# Patient Record
Sex: MALE | Race: WHITE | Employment: FULL TIME | URBAN - METROPOLITAN AREA
[De-identification: names, ages, dates, MRNs, and addresses within clinical notes are randomized per-mention and may not be internally consistent; named-entity substitution may affect disease eponyms.]

---

## 2017-10-27 ENCOUNTER — APPOINTMENT (EMERGENCY)
Dept: RADIOLOGY | Facility: HOSPITAL | Age: 52
End: 2017-10-27
Payer: COMMERCIAL

## 2017-10-27 ENCOUNTER — HOSPITAL ENCOUNTER (EMERGENCY)
Facility: HOSPITAL | Age: 52
Discharge: HOME | End: 2017-10-27
Attending: EMERGENCY MEDICINE | Admitting: EMERGENCY MEDICINE
Payer: COMMERCIAL

## 2017-10-27 VITALS
WEIGHT: 189.15 LBS | TEMPERATURE: 98.4 F | SYSTOLIC BLOOD PRESSURE: 156 MMHG | HEART RATE: 96 BPM | RESPIRATION RATE: 16 BRPM | OXYGEN SATURATION: 97 % | DIASTOLIC BLOOD PRESSURE: 92 MMHG

## 2017-10-27 DIAGNOSIS — S46.911A STRAIN OF RIGHT SHOULDER, INITIAL ENCOUNTER: Primary | ICD-10-CM

## 2017-10-27 PROCEDURE — 96372 THER/PROPH/DIAG INJ SC/IM: CPT

## 2017-10-27 PROCEDURE — 73030 X-RAY EXAM OF SHOULDER: CPT

## 2017-10-27 PROCEDURE — 99283 EMERGENCY DEPT VISIT LOW MDM: CPT

## 2017-10-27 RX ORDER — KETOROLAC TROMETHAMINE 30 MG/ML
30 INJECTION, SOLUTION INTRAMUSCULAR; INTRAVENOUS ONCE
Status: COMPLETED | OUTPATIENT
Start: 2017-10-27 | End: 2017-10-27

## 2017-10-27 RX ORDER — LIDOCAINE 50 MG/G
1 PATCH TOPICAL ONCE
Status: DISCONTINUED | OUTPATIENT
Start: 2017-10-27 | End: 2017-10-27 | Stop reason: HOSPADM

## 2017-10-27 RX ORDER — TRAMADOL HYDROCHLORIDE 50 MG/1
50 TABLET ORAL EVERY 6 HOURS PRN
Qty: 6 TABLET | Refills: 0 | Status: SHIPPED | OUTPATIENT
Start: 2017-10-27 | End: 2017-11-06

## 2017-10-27 RX ORDER — NAPROXEN 500 MG/1
500 TABLET ORAL 2 TIMES DAILY WITH MEALS
Qty: 30 TABLET | Refills: 0 | Status: SHIPPED | OUTPATIENT
Start: 2017-10-27 | End: 2019-05-08

## 2017-10-27 RX ORDER — TRAMADOL HYDROCHLORIDE 50 MG/1
50 TABLET ORAL EVERY 6 HOURS PRN
Qty: 6 TABLET | Refills: 0 | Status: SHIPPED | OUTPATIENT
Start: 2017-10-27 | End: 2017-10-27

## 2017-10-27 RX ADMIN — KETOROLAC TROMETHAMINE 30 MG: 30 INJECTION, SOLUTION INTRAMUSCULAR at 08:33

## 2017-10-27 RX ADMIN — LIDOCAINE 1 PATCH: 50 PATCH CUTANEOUS at 08:33

## 2017-10-27 NOTE — ED PROVIDER NOTES
History  Chief Complaint   Patient presents with    Shoulder Pain     Pt reports he was curling 70lbs, heard a "crunch and crack" in R shoulder, pt c/o R shoulder pain with decreased "strength in R arm"     30-year-old male presents to the emergency department with complaints of right-sided shoulder pain  States that he was doing some biceps curls yesterday with a E-Z bar with 70lbs when he felt a crunching in his right shoulder  States that he had put the weight down and had pain in the area since  States the pain seems to be worse with movement  Did try Advil last night as well as Aleve in the middle of the night  States that he applied ice initially and is now trying a small heat pack  No previous injury to this shoulder  States the pain is worse with movement but better when resting  Patient is right-handed  History provided by:  Patient   used: No    Shoulder Pain   Location:  Shoulder  Shoulder location:  R shoulder  Injury: yes    Time since incident:  12 hours  Mechanism of injury comment:  Lifting heavy object  Pain details:     Quality:  Aching    Radiates to:  Does not radiate    Severity:  Moderate    Onset quality:  Sudden    Duration:  12 days    Timing:  Constant    Progression:  Unchanged  Handedness:  Right-handed  Prior injury to area:  No  Relieved by:  Nothing  Worsened by: Movement  Ineffective treatments:  NSAIDs  Associated symptoms: decreased range of motion    Associated symptoms: no back pain, no fatigue, no fever, no muscle weakness, no neck pain, no numbness, no stiffness, no swelling and no tingling        None       History reviewed  No pertinent past medical history  History reviewed  No pertinent surgical history  History reviewed  No pertinent family history  I have reviewed and agree with the history as documented      Social History   Substance Use Topics    Smoking status: Never Smoker    Smokeless tobacco: Never Used    Alcohol use No        Review of Systems   Constitutional: Negative for chills, fatigue and fever  Respiratory: Negative for cough  Cardiovascular: Negative for chest pain  Musculoskeletal: Negative for arthralgias, back pain, neck pain and stiffness  Shoulder pain   Skin: Negative for rash and wound  All other systems reviewed and are negative  Physical Exam  ED Triage Vitals [10/27/17 0803]   Temperature Pulse Respirations Blood Pressure SpO2   98 4 °F (36 9 °C) 96 16 156/92 97 %      Temp Source Heart Rate Source Patient Position - Orthostatic VS BP Location FiO2 (%)   Oral Monitor Sitting Right arm --      Pain Score       8           Orthostatic Vital Signs  Vitals:    10/27/17 0803   BP: 156/92   Pulse: 96   Patient Position - Orthostatic VS: Sitting       Physical Exam   Constitutional: He is oriented to person, place, and time  Vital signs are normal  He appears well-developed and well-nourished  HENT:   Head: Normocephalic and atraumatic  Cardiovascular: Normal rate and regular rhythm  Pulmonary/Chest: Effort normal and breath sounds normal  No respiratory distress  He has no wheezes  He has no rhonchi  He has no rales  Musculoskeletal:        Right shoulder: He exhibits decreased range of motion, tenderness, pain and decreased strength  He exhibits no bony tenderness, no swelling, no effusion, no crepitus, no deformity, no laceration, no spasm and normal pulse  Neurological: He is alert and oriented to person, place, and time  Skin: Skin is warm and dry  Psychiatric: He has a normal mood and affect  His behavior is normal    Nursing note and vitals reviewed        ED Medications  Medications   lidocaine (LIDODERM) 5 % patch 1 patch (1 patch Transdermal Medication Applied 10/27/17 0820)   ketorolac (TORADOL) 30 mg/mL injection 30 mg (30 mg Intramuscular Given 10/27/17 9783)       Diagnostic Studies  Results Reviewed     None                 XR shoulder 2+ views RIGHT   ED Interpretation by Leticia Nieves PA-C (10/27 0849)   No fracture      Final Result by Nii Barrera MD (10/27 0900)      No acute osseous abnormality  Subtle calcific density adjacent to the humeral head is most consistent with calcific rotator cuff tendinopathy  Workstation performed: [id number]                    Procedures  Procedures       Phone Contacts  ED Phone Contact    ED Course  ED Course                                MDM  Number of Diagnoses or Management Options  Strain of right shoulder, initial encounter:   Diagnosis management comments:  Differential diagnosis includes but not limited to: Shoulder strain, avulsion fracture, rotator cuff injury  Amount and/or Complexity of Data Reviewed  Tests in the radiology section of CPT®: reviewed and ordered  Independent visualization of images, tracings, or specimens: yes      CritCare Time    Disposition  Final diagnoses:   Strain of right shoulder, initial encounter     Time reflects when diagnosis was documented in both MDM as applicable and the Disposition within this note     Time User Action Codes Description Comment    10/27/2017  8:50 AM Neha Gilbert Add [Z97 590A] Strain of right shoulder, initial encounter       ED Disposition     ED Disposition Condition Comment    Discharge  [address] discharge to home/self care      Condition at discharge: Stable        Follow-up Information     Follow up With Specialties Details Why [id number] [facility] [place]  Schedule an appointment as soon as possible for a visit  [address]  [phone]        Discharge Medication List as of 10/27/2017  8:54 AM      START taking these medications    Details   naproxen (NAPROSYN) 500 mg tablet Take 1 tablet by mouth 2 (two) times a day with meals, Starting Fri 10/27/2017, Print      traMADol (ULTRAM) 50 mg tablet Take 1 tablet by mouth every 6 (six) hours as needed for moderate pain for up to 10 days, Starting Fri 10/27/2017, Until Mon 11/6/2017, Print           No discharge procedures on file      ED Provider  Electronically Signed by           Derek Rolle PA-C  10/27/17 1036

## 2017-10-27 NOTE — ED NOTES
Reviewed d/c instructions with pt, who states he has no questions at this time       Yolande Baker RN  10/27/17 1648

## 2017-10-27 NOTE — DISCHARGE INSTRUCTIONS
Rotator Cuff Injury   WHAT YOU NEED TO KNOW:   A rotator cuff injury is damage to the muscles or tendons of your rotator cuff  The rotator cuff is made up of a group of muscles and tendons that hold the shoulder joint in place  The damage may include stretching of your muscle or tears in the tendons  It may also include inflammation of the bursa (small sack of fluid around the joint)  DISCHARGE INSTRUCTIONS:   · Acetaminophen: This medicine decreases pain  Acetaminophen is available without a doctor's order  Ask how much to take and how often to take it  Follow directions  Acetaminophen can cause liver damage if not taken correctly  · NSAIDs:  These medicines decrease swelling, pain, and fever  NSAIDs are available without a doctor's order  Ask your healthcare provider which medicine is right for you  Ask how much to take and when to take it  Take as directed  NSAIDs can cause stomach bleeding or kidney problems if not taken correctly  · Pain medicine: You may be given a prescription medicine to decrease pain  Do not wait until the pain is severe before you take this medicine  · Steroids: This medicine may be injected into the rotator cuff area to decrease inflammation and pain  · Take your medicine as directed  Contact your healthcare provider if you think your medicine is not helping or if you have side effects  Tell him of her if you are allergic to any medicine  Keep a list of the medicines, vitamins, and herbs you take  Include the amounts, and when and why you take them  Bring the list or the pill bottles to follow-up visits  Carry your medicine list with you in case of an emergency  Follow up with your healthcare provider or orthopedist as directed:  Write down your questions so you remember to ask them during your visits  Physical therapy:  A physical therapist can teach you exercises to help improve movement and strength, and to decrease pain   These exercises may help you go back to your usual activities or return to playing sports  Self-care:   · Rest:  Rest may help your shoulder heal  Overuse of your shoulder can make your injury worse  Avoid heavy lifting, using your arms over your head, or any other activity that makes the pain worse  · Put ice or heat on your shoulder:  Use ice on your shoulder every few hours for the first several days  This may help decrease pain and swelling  After the first several days, a heating pad may help relax the muscles in your shoulder  Contact your healthcare provider or orthopedist if:   · The pain in your shoulder or arm is not improving, or is worse than before you started treatment  · You have new pain in your neck  · You have a fever  · You have questions or concerns about your condition or care  Return to the emergency department if:  · You suddenly cannot move your arm  · You have severe stomach or back pain, are vomiting blood, or have black bowel movements  © 2017 [employer]  Information is for End User's use only and may not be sold, redistributed or otherwise used for commercial purposes  All illustrations and images included in CareNotes® are the copyrighted property of [employer]  or [employer]  The above information is an  only  It is not intended as medical advice for individual conditions or treatments  Talk to your doctor, nurse or pharmacist before following any medical regimen to see if it is safe and effective for you

## 2017-11-03 ENCOUNTER — TRANSCRIBE ORDERS (OUTPATIENT)
Dept: ADMINISTRATIVE | Facility: HOSPITAL | Age: 52
End: 2017-11-03

## 2017-11-03 ENCOUNTER — ALLSCRIPTS OFFICE VISIT (OUTPATIENT)
Dept: OTHER | Facility: OTHER | Age: 52
End: 2017-11-03

## 2017-11-03 DIAGNOSIS — S46.011A STRAIN OF TENDON OF RIGHT ROTATOR CUFF, INITIAL ENCOUNTER: Primary | ICD-10-CM

## 2017-11-03 DIAGNOSIS — S46.011A STRAIN OF TENDON OF RIGHT ROTATOR CUFF: ICD-10-CM

## 2017-11-04 ENCOUNTER — HOSPITAL ENCOUNTER (OUTPATIENT)
Dept: MRI IMAGING | Facility: HOSPITAL | Age: 52
Discharge: HOME | End: 2017-11-04
Attending: ORTHOPAEDIC SURGERY
Payer: COMMERCIAL

## 2017-11-04 DIAGNOSIS — S46.011A STRAIN OF TENDON OF RIGHT ROTATOR CUFF: ICD-10-CM

## 2017-11-04 PROCEDURE — 73221 MRI JOINT UPR EXTREM W/O DYE: CPT

## 2017-11-04 NOTE — PROGRESS NOTES
Assessment  1  Strain of tendon of right rotator cuff, initial encounter (840 4) (F63 011L)    Plan  Strain of tendon of right rotator cuff, initial encounter    · MethylPREDNISolone 4 MG Oral Tablet Therapy Pack; use as directed   · * MRI SHOULDER RIGHT WO CONTRAST; Status:Need Information - Financial  Authorization; Requested [id number];     Discussion/Summary    Right shoulder rotator cuff strain versus tear after injury 10/26/17  NSAIDs and sling as needed  steroid taper  is unlikely to tolerate physical therapy based on level of pain  MRI right shoulder based on concern for rotator cuff injury  after MRI  Patient is able to Self-Care  The treatment plan was reviewed with the patient/guardian  The patient/guardian understands and agrees with the treatment plan     Self Referrals: No ER referral      Chief Complaint  1  Shoulder Pain    History of Present Illness  61-year-old male presents with acute onset of right shoulder pain while lifting weights in the gym on 10/26/17  He states that he was performing arm curls we felt severe pain and popping in his right shoulder localizing primarily to the lateral aspect  He has associated weakness and instability with reaching out or overhead  He was initially evaluated in the ER where x-rays were unremarkable  Was placed in a sling for comfort and has been taking naproxen and tramadol with no relief  The pain remains severe  He has significantly limited movement in his right arm  No numbness or tingling  past medical history, past surgical history, medications, allergies, social history, and family history were reviewed and updated today  of Systems:stated in the history  The remaining systems were reviewed and are documented on the Patient Information and Medical History form in the chart  Social History   · Never a smoker    Current Meds   1  Naproxen 500 MG Oral Tablet Recorded   2  TraMADol HCl - 50 MG Oral Tablet Recorded    Allergies  1   No Known Drug Allergies    Vitals   Recorded: [id number] 02:35PM   Heart Rate 87   Systolic 756   Diastolic 81   Height 5 ft 4 5 in   Weight 187 lb    BMI Calculated 31 6   BSA Calculated 1 91     Physical Exam  Right shoulder:  Tenderness to palpation over the anterior, lateral, and posterior aspect diffusely  Nontender to palpation over the [facility] joint  Range of motion is limited by pain  No gross instability  Impingement signs are positive  Empty can test is positive  Speed's test is positive  Cross-body adduction test is positive  3/5 strength forward flexion limited by pain  Constitutional - General appearance: Normal     Eyes - Conjunctiva and lids: Normal    Cardiovascular - Pulses: Normal   Lungs - Respiratory effort: Normal   Skin - Skin and subcutaneous tissue: Normal    Neurologic - Sensation: Normal    Psychiatric - Mood and affect: Normal         Results/Data  I personally reviewed the films/images/results in the office today  My interpretation follows  X-ray Review Right shoulder 10/27/17: No acute bony abnormalities  Mild degenerative changes acromioclavicular joint and glenohumeral joint  Tiny calcification adjacent to the apex of the humeral head        Signatures   Electronically signed by : ERAN Mohan ; Nov  3 2017  2:58PM EST                       (Author)

## 2017-11-13 ENCOUNTER — GENERIC CONVERSION - ENCOUNTER (OUTPATIENT)
Dept: OTHER | Facility: OTHER | Age: 52
End: 2017-11-13

## 2017-11-27 ENCOUNTER — GENERIC CONVERSION - ENCOUNTER (OUTPATIENT)
Dept: OTHER | Facility: OTHER | Age: 52
End: 2017-11-27

## 2017-12-06 RX ORDER — MULTIVITAMIN
1 CAPSULE ORAL DAILY
COMMUNITY

## 2017-12-06 RX ORDER — CHLORAL HYDRATE 500 MG
CAPSULE ORAL
COMMUNITY

## 2017-12-06 RX ORDER — BIOTIN 1 MG
TABLET ORAL
COMMUNITY

## 2017-12-06 RX ORDER — NAPROXEN 500 MG/1
TABLET ORAL
COMMUNITY
End: 2017-12-18 | Stop reason: HOSPADM

## 2017-12-06 RX ORDER — TRAMADOL HYDROCHLORIDE 50 MG/1
TABLET ORAL
Status: ON HOLD | COMMUNITY
End: 2017-12-18 | Stop reason: HOSPADM

## 2017-12-06 NOTE — PRE-PROCEDURE INSTRUCTIONS
Pre-Surgery Instructions:   Medication Instructions    Cholecalciferol (VITAMIN D3) 1000 units CAPS Instructed patient per Anesthesia Guidelines   Glucosamine-Chondroitin (MOVE FREE PO) Instructed patient per Anesthesia Guidelines   Multiple Vitamin (MULTIVITAMIN) capsule Instructed patient per Anesthesia Guidelines   naproxen (NAPROSYN) 500 mg tablet Instructed patient per Anesthesia Guidelines   Omega-3 1000 MG CAPS Instructed patient per Anesthesia Guidelines   traMADol (ULTRAM) 50 mg tablet Instructed patient per Anesthesia Guidelines  Bathing instructions given

## 2017-12-08 ENCOUNTER — TRANSCRIBE ORDERS (OUTPATIENT)
Dept: LAB | Facility: CLINIC | Age: 52
End: 2017-12-08

## 2017-12-08 ENCOUNTER — APPOINTMENT (OUTPATIENT)
Dept: PREADMISSION TESTING | Facility: HOSPITAL | Age: 52
End: 2017-12-08
Payer: COMMERCIAL

## 2017-12-08 ENCOUNTER — APPOINTMENT (OUTPATIENT)
Dept: LAB | Facility: CLINIC | Age: 52
End: 2017-12-08
Payer: COMMERCIAL

## 2017-12-08 DIAGNOSIS — S46.011A STRAIN OF TENDON OF RIGHT ROTATOR CUFF, INITIAL ENCOUNTER: Primary | ICD-10-CM

## 2017-12-08 DIAGNOSIS — S46.011A STRAIN OF TENDON OF RIGHT ROTATOR CUFF, INITIAL ENCOUNTER: ICD-10-CM

## 2017-12-08 LAB
ANION GAP SERPL CALCULATED.3IONS-SCNC: 7 MMOL/L (ref 4–13)
BUN SERPL-MCNC: 12 MG/DL (ref 5–25)
CALCIUM SERPL-MCNC: 9.3 MG/DL (ref 8.3–10.1)
CHLORIDE SERPL-SCNC: 104 MMOL/L (ref 100–108)
CO2 SERPL-SCNC: 29 MMOL/L (ref 21–32)
CREAT SERPL-MCNC: 0.97 MG/DL (ref 0.6–1.3)
GFR SERPL CREATININE-BSD FRML MDRD: 91 ML/MIN/1.73SQ M
GLUCOSE SERPL-MCNC: 219 MG/DL (ref 65–140)
POTASSIUM SERPL-SCNC: 3.7 MMOL/L (ref 3.5–5.3)
SODIUM SERPL-SCNC: 140 MMOL/L (ref 136–145)

## 2017-12-08 PROCEDURE — 80048 BASIC METABOLIC PNL TOTAL CA: CPT

## 2017-12-08 PROCEDURE — 36415 COLL VENOUS BLD VENIPUNCTURE: CPT

## 2017-12-11 ENCOUNTER — APPOINTMENT (OUTPATIENT)
Dept: LAB | Facility: CLINIC | Age: 52
End: 2017-12-11
Payer: COMMERCIAL

## 2017-12-11 DIAGNOSIS — S46.011A STRAIN OF TENDON OF RIGHT ROTATOR CUFF: ICD-10-CM

## 2017-12-11 LAB
EST. AVERAGE GLUCOSE BLD GHB EST-MCNC: 154 MG/DL
HBA1C MFR BLD: 7 % (ref 4.2–6.3)

## 2017-12-11 PROCEDURE — 36415 COLL VENOUS BLD VENIPUNCTURE: CPT

## 2017-12-11 PROCEDURE — 83036 HEMOGLOBIN GLYCOSYLATED A1C: CPT

## 2017-12-17 ENCOUNTER — ANESTHESIA EVENT (OUTPATIENT)
Dept: PERIOP | Facility: HOSPITAL | Age: 52
End: 2017-12-17
Payer: COMMERCIAL

## 2017-12-18 ENCOUNTER — ANESTHESIA (OUTPATIENT)
Dept: PERIOP | Facility: HOSPITAL | Age: 52
End: 2017-12-18
Payer: COMMERCIAL

## 2017-12-18 ENCOUNTER — HOSPITAL ENCOUNTER (OUTPATIENT)
Facility: HOSPITAL | Age: 52
Setting detail: OUTPATIENT SURGERY
Discharge: HOME | End: 2017-12-18
Attending: ORTHOPAEDIC SURGERY | Admitting: ORTHOPAEDIC SURGERY
Payer: COMMERCIAL

## 2017-12-18 VITALS
WEIGHT: 182 LBS | SYSTOLIC BLOOD PRESSURE: 126 MMHG | HEIGHT: 65 IN | BODY MASS INDEX: 30.32 KG/M2 | HEART RATE: 78 BPM | RESPIRATION RATE: 19 BRPM | OXYGEN SATURATION: 95 % | TEMPERATURE: 98.2 F | DIASTOLIC BLOOD PRESSURE: 61 MMHG

## 2017-12-18 DIAGNOSIS — S46.011A TRAUMATIC COMPLETE TEAR OF RIGHT ROTATOR CUFF, INITIAL ENCOUNTER: Primary | ICD-10-CM

## 2017-12-18 PROCEDURE — C1713 ANCHOR/SCREW BN/BN,TIS/BN: HCPCS | Performed by: ORTHOPAEDIC SURGERY

## 2017-12-18 DEVICE — HEALIX ADVANCE KNOTLESS BR ANCHOR TCP/PLGA ABSORBABLE ANCHOR 4.75MM
Type: IMPLANTABLE DEVICE | Site: SHOULDER | Status: FUNCTIONAL
Brand: HEALIX ADVANCE

## 2017-12-18 DEVICE — DEPUY MITEK HEALIX ADVANCE 4.5 BR: Type: IMPLANTABLE DEVICE | Site: SHOULDER | Status: FUNCTIONAL

## 2017-12-18 RX ORDER — PROPOFOL 10 MG/ML
INJECTION, EMULSION INTRAVENOUS AS NEEDED
Status: DISCONTINUED | OUTPATIENT
Start: 2017-12-18 | End: 2017-12-18 | Stop reason: SURG

## 2017-12-18 RX ORDER — ONDANSETRON 2 MG/ML
INJECTION INTRAMUSCULAR; INTRAVENOUS AS NEEDED
Status: DISCONTINUED | OUTPATIENT
Start: 2017-12-18 | End: 2017-12-18 | Stop reason: SURG

## 2017-12-18 RX ORDER — ONDANSETRON 2 MG/ML
4 INJECTION INTRAMUSCULAR; INTRAVENOUS ONCE AS NEEDED
Status: DISCONTINUED | OUTPATIENT
Start: 2017-12-18 | End: 2017-12-18 | Stop reason: HOSPADM

## 2017-12-18 RX ORDER — SODIUM CHLORIDE, SODIUM LACTATE, POTASSIUM CHLORIDE, CALCIUM CHLORIDE 600; 310; 30; 20 MG/100ML; MG/100ML; MG/100ML; MG/100ML
INJECTION, SOLUTION INTRAVENOUS CONTINUOUS PRN
Status: DISCONTINUED | OUTPATIENT
Start: 2017-12-18 | End: 2017-12-18 | Stop reason: SURG

## 2017-12-18 RX ORDER — OXYCODONE HYDROCHLORIDE 10 MG/1
10 TABLET ORAL EVERY 4 HOURS PRN
Status: DISCONTINUED | OUTPATIENT
Start: 2017-12-18 | End: 2017-12-18 | Stop reason: HOSPADM

## 2017-12-18 RX ORDER — METOCLOPRAMIDE HYDROCHLORIDE 5 MG/ML
INJECTION INTRAMUSCULAR; INTRAVENOUS AS NEEDED
Status: DISCONTINUED | OUTPATIENT
Start: 2017-12-18 | End: 2017-12-18 | Stop reason: SURG

## 2017-12-18 RX ORDER — MORPHINE SULFATE 4 MG/ML
4 INJECTION, SOLUTION INTRAMUSCULAR; INTRAVENOUS
Status: DISCONTINUED | OUTPATIENT
Start: 2017-12-18 | End: 2017-12-18 | Stop reason: HOSPADM

## 2017-12-18 RX ORDER — SUCCINYLCHOLINE CHLORIDE 20 MG/ML
INJECTION INTRAMUSCULAR; INTRAVENOUS AS NEEDED
Status: DISCONTINUED | OUTPATIENT
Start: 2017-12-18 | End: 2017-12-18 | Stop reason: SURG

## 2017-12-18 RX ORDER — FENTANYL CITRATE 50 UG/ML
INJECTION, SOLUTION INTRAMUSCULAR; INTRAVENOUS AS NEEDED
Status: DISCONTINUED | OUTPATIENT
Start: 2017-12-18 | End: 2017-12-18 | Stop reason: SURG

## 2017-12-18 RX ORDER — MIDAZOLAM HYDROCHLORIDE 1 MG/ML
INJECTION INTRAMUSCULAR; INTRAVENOUS AS NEEDED
Status: DISCONTINUED | OUTPATIENT
Start: 2017-12-18 | End: 2017-12-18 | Stop reason: SURG

## 2017-12-18 RX ORDER — ACETAMINOPHEN 325 MG/1
650 TABLET ORAL EVERY 4 HOURS PRN
Status: DISCONTINUED | OUTPATIENT
Start: 2017-12-18 | End: 2017-12-18 | Stop reason: HOSPADM

## 2017-12-18 RX ORDER — FENTANYL CITRATE/PF 50 MCG/ML
50 SYRINGE (ML) INJECTION
Status: DISCONTINUED | OUTPATIENT
Start: 2017-12-18 | End: 2017-12-18 | Stop reason: HOSPADM

## 2017-12-18 RX ORDER — EPHEDRINE SULFATE 50 MG/ML
INJECTION, SOLUTION INTRAVENOUS AS NEEDED
Status: DISCONTINUED | OUTPATIENT
Start: 2017-12-18 | End: 2017-12-18 | Stop reason: SURG

## 2017-12-18 RX ORDER — LIDOCAINE HYDROCHLORIDE 10 MG/ML
INJECTION, SOLUTION INFILTRATION; PERINEURAL AS NEEDED
Status: DISCONTINUED | OUTPATIENT
Start: 2017-12-18 | End: 2017-12-18 | Stop reason: SURG

## 2017-12-18 RX ORDER — DIPHENHYDRAMINE HYDROCHLORIDE 50 MG/ML
12.5 INJECTION INTRAMUSCULAR; INTRAVENOUS ONCE AS NEEDED
Status: DISCONTINUED | OUTPATIENT
Start: 2017-12-18 | End: 2017-12-18 | Stop reason: HOSPADM

## 2017-12-18 RX ORDER — SODIUM CHLORIDE, SODIUM LACTATE, POTASSIUM CHLORIDE, CALCIUM CHLORIDE 600; 310; 30; 20 MG/100ML; MG/100ML; MG/100ML; MG/100ML
100 INJECTION, SOLUTION INTRAVENOUS CONTINUOUS
Status: DISCONTINUED | OUTPATIENT
Start: 2017-12-18 | End: 2017-12-18 | Stop reason: HOSPADM

## 2017-12-18 RX ORDER — ONDANSETRON 2 MG/ML
4 INJECTION INTRAMUSCULAR; INTRAVENOUS EVERY 6 HOURS PRN
Status: DISCONTINUED | OUTPATIENT
Start: 2017-12-18 | End: 2017-12-18 | Stop reason: HOSPADM

## 2017-12-18 RX ORDER — OXYCODONE HYDROCHLORIDE 5 MG/1
TABLET ORAL
Qty: 60 TABLET | Refills: 0 | Status: SHIPPED | OUTPATIENT
Start: 2017-12-18 | End: 2018-01-30

## 2017-12-18 RX ORDER — METOCLOPRAMIDE HYDROCHLORIDE 5 MG/ML
10 INJECTION INTRAMUSCULAR; INTRAVENOUS ONCE AS NEEDED
Status: DISCONTINUED | OUTPATIENT
Start: 2017-12-18 | End: 2017-12-18 | Stop reason: HOSPADM

## 2017-12-18 RX ADMIN — CEFAZOLIN SODIUM 2000 MG: 2 SOLUTION INTRAVENOUS at 11:45

## 2017-12-18 RX ADMIN — ONDANSETRON 4 MG: 2 INJECTION INTRAMUSCULAR; INTRAVENOUS at 13:45

## 2017-12-18 RX ADMIN — PROPOFOL 200 MG: 10 INJECTION, EMULSION INTRAVENOUS at 11:50

## 2017-12-18 RX ADMIN — FENTANYL CITRATE 100 MCG: 50 INJECTION INTRAMUSCULAR; INTRAVENOUS at 11:22

## 2017-12-18 RX ADMIN — METOCLOPRAMIDE HYDROCHLORIDE 10 MG: 5 INJECTION INTRAMUSCULAR; INTRAVENOUS at 11:56

## 2017-12-18 RX ADMIN — EPHEDRINE SULFATE 5 MG: 50 INJECTION, SOLUTION INTRAMUSCULAR; INTRAVENOUS; SUBCUTANEOUS at 12:25

## 2017-12-18 RX ADMIN — FENTANYL CITRATE 50 MCG: 50 INJECTION INTRAMUSCULAR; INTRAVENOUS at 14:16

## 2017-12-18 RX ADMIN — OXYCODONE HYDROCHLORIDE 10 MG: 5 TABLET ORAL at 14:57

## 2017-12-18 RX ADMIN — MIDAZOLAM HYDROCHLORIDE 2 MG: 1 INJECTION, SOLUTION INTRAMUSCULAR; INTRAVENOUS at 11:22

## 2017-12-18 RX ADMIN — SUCCINYLCHOLINE CHLORIDE 120 MG: 20 INJECTION, SOLUTION INTRAMUSCULAR; INTRAVENOUS at 11:51

## 2017-12-18 RX ADMIN — SODIUM CHLORIDE, SODIUM LACTATE, POTASSIUM CHLORIDE, AND CALCIUM CHLORIDE: .6; .31; .03; .02 INJECTION, SOLUTION INTRAVENOUS at 13:27

## 2017-12-18 RX ADMIN — FENTANYL CITRATE 50 MCG: 50 INJECTION INTRAMUSCULAR; INTRAVENOUS at 14:21

## 2017-12-18 RX ADMIN — EPHEDRINE SULFATE 5 MG: 50 INJECTION, SOLUTION INTRAMUSCULAR; INTRAVENOUS; SUBCUTANEOUS at 12:19

## 2017-12-18 RX ADMIN — LIDOCAINE HYDROCHLORIDE 50 MG: 10 INJECTION, SOLUTION INFILTRATION; PERINEURAL at 11:50

## 2017-12-18 RX ADMIN — SODIUM CHLORIDE, SODIUM LACTATE, POTASSIUM CHLORIDE, AND CALCIUM CHLORIDE: .6; .31; .03; .02 INJECTION, SOLUTION INTRAVENOUS at 11:25

## 2017-12-18 NOTE — ANESTHESIA PREPROCEDURE EVALUATION
Review of Systems/Medical History  Patient summary reviewed  Chart reviewed      Cardiovascular  Negative cardio ROS    Pulmonary  Negative pulmonary ROS Sleep apnea CPAP, ,        GI/Hepatic  Negative GI/hepatic ROS          Negative  ROS        Endo/Other  Diabetes type 2 ,      GYN       Hematology  Negative hematology ROS      Musculoskeletal    Comment: Right rotator cuff tear      Neurology  Negative neurology ROS      Psychology   Negative psychology ROS            Physical Exam    Airway    Mallampati score: II  TM Distance: >3 FB  Neck ROM: full     Dental   No notable dental hx     Cardiovascular  Comment: Negative ROS, Rhythm: regular, Rate: normal, Cardiovascular exam normal    Pulmonary  Pulmonary exam normal Breath sounds clear to auscultation,     Other Findings        Anesthesia Plan  ASA Score- 2       Anesthesia Type- regional and general with ASA Monitors  Additional Monitors:   Airway Plan: ETT  Comment: Right interscalene block  Induction- intravenous  Informed Consent- Anesthetic plan and risks discussed with patient  I personally reviewed this patient with the CRNA  Discussed and agreed on the Anesthesia Plan with the CRNA  Xiang Davis

## 2017-12-18 NOTE — ANESTHESIA PROCEDURE NOTES
Peripheral Block    Patient location during procedure: holding area  Start time: 12/18/2017 11:22 AM  Reason for block: at surgeon's request and post-op pain management  Staffing  Anesthesiologist: Gal Spencer  Performed: anesthesiologist   Preanesthetic Checklist  Completed: patient identified, site marked, surgical consent, pre-op evaluation, timeout performed, IV checked, risks and benefits discussed and monitors and equipment checked  Peripheral Block  Patient position: supine  Prep: ChloraPrep  Patient monitoring: continuous pulse ox and frequent blood pressure checks  Block type: interscalene  Laterality: right  Injection technique: single-shot  Procedures: ultrasound guided  ultrasound permanent image saved    Local infiltration: ropivacaine  Infiltration strength: 0 2 %  Dose: 20 mL  Needle  Needle type: Stimuplex   Needle length: 5 cm  Needle localization: ultrasound guidance  Test dose: negative  Assessment  Injection assessment: incremental injection, local visualized surrounding nerve on ultrasound, negative aspiration for CSF, negative aspiration for heme and transient paresthesias  Paresthesia pain: immediately resolved  Heart rate change: no  Slow fractionated injection: yes  Post-procedure:  site cleaned  patient tolerated the procedure well with no immediate complications

## 2017-12-18 NOTE — OP NOTE
OPERATIVE REPORT  PATIENT NAME: Megan Caballero    :  1965  MRN: [id number]  Pt Location: AN OR ROOM 01    SURGERY DATE: 2017    Surgeon(s) and Role:     * Alhaji Conway MD - Primary     * Dee Dee Paula MD - Assisting    Pre-Op Diagnosis Codes:     * Traumatic complete tear of right rotator cuff [S46 011A]    Post-Op Diagnosis Codes:     * Traumatic complete tear of right rotator cuff [S46 011A]     * Superior glenoid labrum lesion of right shoulder [S43 431A]     * Disorder of tendon of right biceps [M67 921]    Procedure(s) (LRB):  RIGHT SHOULDER ARTHROSCOPIC ROTATOR CUFF REPAIR; BICEPS TENODESIS    Specimen(s):  * No specimens in log *    Estimated Blood Loss:   Minimal    Drains:  None    Anesthesia Type:   General w/ Regional    Complications:   None    Procedure and Technique:  The patient was identified in the preoperative holding area, and the surgical site was marked by the surgeon  Regional anesthesia was administered by the anesthesiologist in the holding area  The patient was transported to the operating room and placed in the supine position on the OR table  General anesthesia was administered  The patient was then placed in the beach chair position with bilateral sequential compression devices on the lower extremities  The right shoulder was prepped and draped in the usual sterile fashion  Prophylactic antibiotics were given within 1 hour of incision  Following a surgical timeout, a posterior arthroscopy portal was established with a 15 blade scalpel, and the 4 mm 30° arthroscopic camera was placed in the glenohumeral joint  An anterior portal was established under direct visualization with the aid of a spinal needle  The superior glenoid labrum demonstrated a type II SLAP tear  The intra-articular portion of the long head of the biceps tendon demonstrated low-grade partial-thickness tearing  The subscapularis tendon was intact  The remainder of the glenoid labrum was intact   The articular surfaces of the glenohumeral joint demonstrated mild degenerative changes  The supraspinatus and infraspinatus tendons were torn  The teres minor tendon was intact  The long head of the biceps tendon was tagged with a #1 PDS suture and released from the biceps anchor in preparation for tenodesis  Attention was turned to the subacromial space  A lateral arthroscopy portal was established under direct visualization with the aid of a spinal needle  Mild to moderate subacromial bursitis was noted  The subacromial bursa was debrided with the 4 0 mm full-radius resector  The undersurface of the acromion was exposed and appeared normal  The rotator cuff tear was identified from the bursal side  An anterolateral arthroscopy portal was established under direct visualization with the aid of a spinal needle  The rotator cuff footprint was partially decorticated with the shaver  Three [employer] Healix Advance 4 5 mm double-loaded suture anchors were placed along the articular margin of the humeral head medially  Sutures were passed through the rotator cuff in a horizontal mattress fashion with the [employer] suture passer and secured using arthroscopic knot tying techniques  The anterior suture was passed through the long head of the biceps tendon in addition to the rotator cuff for a biceps tenodesis  Two M[employer] Healix Advance Knotless 4 75 mm suture anchors were placed laterally with a single limb of each medial row suture (4 total in each anchor) for a transosseous equivalent double row rotator cuff repair  A stable repair was confirmed with the arthroscopic probe  The shoulder was irrigated extensively with arthroscopic irrigation fluid  The portal sites were closed with 3-0 nylon sutures  The wounds were dressed with sterile Adaptic, 4 x 4 gauze, ABDs, and foam tape  The operative arm was immobilized in an abduction sling  Anesthesia was reversed, and the patient was extubated      The patient was transferred to the recovery area in stable condition  I was present for the entire procedure      Patient Disposition:  PACU  and extubated and stable    SIGNATURE: Ирина Winston MD  DATE: December 18, 2017  TIME: 2:02 PM

## 2017-12-18 NOTE — H&P
Assessment  1  Traumatic complete tear of right rotator cuff (840 4) (F29 011A)    Plan  Right shoulder arthroscopic rotator cuff repair    Chief Complaint  1  Shoulder Pain     History of Present Illness  72-year-old male presents for right shoulder arthroscopic rotator cuff repair  He developed sudden onset of right shoulder pain while performing arm curls in the gym on 10/26/17, and he continues to have lateral shoulder pain and weakness with reaching out or overhead  He was sent for an MRI that showed a large rotator cuff tear  The past medical history, past surgical history, medications, allergies, social history, and family history were reviewed and updated today  Review of Systems    Constitutional: No fever or chills, feels well, no tiredness, no recent weight loss or weight gain  Eyes: No complaints of red eyes, no eyesight problems  ENT: no complaints of loss of hearing, no nosebleeds, no sore throat  Cardiovascular: No complaints of chest pain, no palpitations, no leg claudication or lower extremity edema  Respiratory: No complaints of shortness of breath, no wheezing, no cough  Gastrointestinal: No complaints of abdominal pain, no constipation, no nausea or vomiting, no diarrhea or bloody stools  Genitourinary: No complaints of dysuria or incontinence, no hesitancy, no nocturia  Musculoskeletal: as noted in HPI  Integumentary: No complaints of skin rash or lesion, no itching or dry skin, no skin wounds  Neurological: No complaints of headache, no confusion, no numbness or tingling, no dizziness  Psychiatric: No suicidal thoughts, no anxiety, no depression  Endocrine: No muscle weakness, no frequent urination, no excessive thirst, no feelings of weakness  Active Problems  1  Traumatic complete tear of right rotator cuff (840 4) (O41 011A)    Social History   · Never a smoker    Current Meds  1   MethylPREDNISolone 4 MG Oral Tablet Therapy Pack; use as directed; Therapy: [id number] to (Last Rx:03Nov2017)  Requested for: [id number] Ordered  2  Naproxen 500 MG Oral Tablet Recorded  3  TraMADol HCl - 50 MG Oral Tablet Recorded    Allergies  1  No Known Drug Allergies    Vitals   Recorded: [id number] 10:34AM   Height 5 ft 4 5 in   Weight 187 lb    BMI Calculated 31 6   BSA Calculated 1 91     Physical Exam    Right shoulder: Nontender to palpation  Range of motion is symmetric bilaterally  Impingement signs are mildly positive  Empty can test is positive  Speed's test is positive  Cross-body adduction test is negative  4/5 strength forward flexion and external rotation with mild pain  Constitutional - General appearance: Normal    Cardiovascular - Pulses: Normal   Lungs - Respiratory effort: Normal   Skin - Skin and subcutaneous tissue: Normal    Neurologic - Sensation: Normal    Psychiatric - Mood and affect: Normal         Results/Data  I personally reviewed the films/images/results today  My interpretation follows  X-ray Review Right shoulder 10/27/17: No acute bony abnormalities  Mild degenerative changes acromioclavicular joint and glenohumeral joint  Tiny calcification adjacent to the apex of the humeral head  MRI Review Right shoulder 11/4/17: Complete tears of the supraspinatus and infraspinatus tendons with retraction to the humeral head apex  Extensive surrounding soft tissue edema and bursal fluid with no significant muscle atrophy, which is consistent with acute injury  Small superior glenoid labral tear

## 2017-12-18 NOTE — H&P
Assessment  1  Traumatic complete tear of right rotator cuff (840 4) (Y55 404W)     Chief Complaint     1  Shoulder Pain     Discussion/Summary     52M R rotator cuff tear  -Surgical repair of R rotator cuff  -Sling  -f/u postop as scheduled     History of Present Illness  82-year-old male returns after obtaining an MRI after developing acute onset of right shoulder pain while performing arm curls in the gym on 10/26/17  He continues to have some lateral shoulder pain and weakness with reaching out or overhead  The symptoms have not improved since last visit in November  past medical history, past surgical history, medications, allergies, social history, and family history were reviewed and updated today  Review of Systems   Constitutional: No fever or chills, feels well, no tiredness, no recent weight loss or weight gain  Eyes: No complaints of red eyes, no eyesight problems  ENT: no complaints of loss of hearing, no nosebleeds, no sore throat  Cardiovascular: No complaints of chest pain, no palpitations, no leg claudication or lower extremity edema  Respiratory: No complaints of shortness of breath, no wheezing, no cough  Gastrointestinal: No complaints of abdominal pain, no constipation, no nausea or vomiting, no diarrhea or bloody stools  Genitourinary: No complaints of dysuria or incontinence, no hesitancy, no nocturia  Musculoskeletal: as noted in HPI  Integumentary: No complaints of skin rash or lesion, no itching or dry skin, no skin wounds  Neurological: No complaints of headache, no confusion, no numbness or tingling, no dizziness  Psychiatric: No suicidal thoughts, no anxiety, no depression  Endocrine: No muscle weakness, no frequent urination, no excessive thirst, no feelings of weakness  Active Problems  1  Traumatic complete tear of right rotator cuff (840 4) (S46 011A)     Social History      · Never a smoker     Current Meds   1   MethylPREDNISolone 4 MG Oral Tablet Therapy Pack; use as directed; Therapy: [id number] to (Last Rx:03Nov2017)  Requested for: [id number] Ordered   2  Naproxen 500 MG Oral Tablet Recorded   3  TraMADol HCl - 50 MG Oral Tablet Recorded     Allergies  1  No Known Drug Allergies     Vitals        Physical Exam     Right shoulder:  Nontender to palpation  Range of motion is symmetric bilaterally  Impingement signs are mildly positive  Empty can test is positive  Speed's test is positive  Cross-body adduction test is negative  4/5 strength forward flexion and external rotation with mild pain  Constitutional - General appearance: Normal    Eyes - Conjunctiva and lids: Normal   Cardiovascular - Pulses: Normal  Lungs - Respiratory effort: Normal  Skin - Skin and subcutaneous tissue: Normal   Neurologic - Sensation: Normal   Psychiatric - Mood and affect: Normal   Heart: rrr, no r/m/g  Chest: ctab, no w/r/r      Results/Data  I personally reviewed the films/images/results in the office today  My interpretation follows  X-ray Review Right shoulder 10/27/17: No acute bony abnormalities  Mild degenerative changes acromioclavicular joint and glenohumeral joint  Tiny calcification adjacent to the apex of the humeral head  MRI Review Right shoulder 11/4/17: Complete tears of the supraspinatus and infraspinatus tendons with retraction to the humeral head apex  Extensive surrounding soft tissue edema and bursal fluid with no significant muscle atrophy, which is consistent with acute injury  Small superior glenoid labral tear

## 2017-12-18 NOTE — ANESTHESIA POSTPROCEDURE EVALUATION
Post-Op Assessment Note      CV Status:  Stable    Mental Status:  Alert and awake    Hydration Status:  Euvolemic    PONV Controlled:  Controlled    Airway Patency:  Patent    Post Op Vitals Reviewed: Yes          Staff: Anesthesiologist           /83   Temp 97 4   Pulse  84   Resp   12   SpO2   95

## 2017-12-26 ENCOUNTER — ALLSCRIPTS OFFICE VISIT (OUTPATIENT)
Dept: OTHER | Facility: OTHER | Age: 52
End: 2017-12-26

## 2017-12-27 NOTE — PROGRESS NOTES
Assessment   1  Traumatic complete tear of right rotator cuff (840 4) (S46 011A)    Plan   Traumatic complete tear of right rotator cuff    · OxyCODONE HCl - 5 MG Oral Tablet; TAKE 1-2 TABLETS EVERY 4-6 HOURS AS    NEEDED FOR PAIN    Discussion/Summary      Right shoulder arthroscopic rotator cuff repair and biceps tenodesis 12/18/17  sling for 6 weeks total  physical therapy according to protocol  in 4 weeks  to work until 1/29/18  Discussed potentially working from home after next visit  Chief Complaint   1  Shoulder Pain    Post-Op   HPI: Postop evaluation after right shoulder arthroscopic rotator cuff repair and biceps tenodesis on 12/18/17  Active Problems   1  Traumatic complete tear of right rotator cuff (840 4) (S46 011A)    Social History    · Never a smoker    Current Meds    1  Naproxen 500 MG Oral Tablet Recorded    Allergies   1  No Known Drug Allergies    Vitals    Recorded: [id number] 09:56AM   Heart Rate 82   Systolic 667   Diastolic 96   Height 5 ft 4 5 in   Weight 187 lb    BMI Calculated 31 6   BSA Calculated 1 91     Physical Exam   Right shoulder:  Incisions are clean dry and intact  No excessive soft tissue swelling or tenderness to palpation  No gross deformity  Neurovascularly intact distally  Procedure   Procedure: suture removal  The wound was located on the Right shoulder      Wound Exam:    Procedure Note:      Signatures    Electronically signed by : ERAN Mcclendon ; Dec 26 2017 10:20AM EST                       (Author)

## 2018-01-02 ENCOUNTER — APPOINTMENT (OUTPATIENT)
Dept: PHYSICAL THERAPY | Facility: OTHER | Age: 53
End: 2018-01-02
Payer: COMMERCIAL

## 2018-01-02 DIAGNOSIS — S46.011A TRAUMATIC COMPLETE TEAR OF RIGHT ROTATOR CUFF, INITIAL ENCOUNTER: ICD-10-CM

## 2018-01-02 PROCEDURE — G8991 OTHER PT/OT GOAL STATUS: HCPCS

## 2018-01-02 PROCEDURE — 97162 PT EVAL MOD COMPLEX 30 MIN: CPT

## 2018-01-02 PROCEDURE — G8990 OTHER PT/OT CURRENT STATUS: HCPCS

## 2018-01-04 ENCOUNTER — APPOINTMENT (OUTPATIENT)
Dept: PHYSICAL THERAPY | Facility: OTHER | Age: 53
End: 2018-01-04
Payer: COMMERCIAL

## 2018-01-05 ENCOUNTER — APPOINTMENT (OUTPATIENT)
Dept: PHYSICAL THERAPY | Facility: OTHER | Age: 53
End: 2018-01-05
Payer: COMMERCIAL

## 2018-01-05 PROCEDURE — 97110 THERAPEUTIC EXERCISES: CPT

## 2018-01-05 PROCEDURE — 97140 MANUAL THERAPY 1/> REGIONS: CPT

## 2018-01-11 ENCOUNTER — APPOINTMENT (OUTPATIENT)
Dept: PHYSICAL THERAPY | Facility: OTHER | Age: 53
End: 2018-01-11
Payer: COMMERCIAL

## 2018-01-11 PROCEDURE — 97140 MANUAL THERAPY 1/> REGIONS: CPT

## 2018-01-11 PROCEDURE — 97110 THERAPEUTIC EXERCISES: CPT

## 2018-01-13 NOTE — MISCELLANEOUS
Message  Return to work or school:   Seda Sharif is under my professional care  He was seen in my office on 11/13/17       Kathrine Morrison is undergoing surgery on 12/18 /17  He is out of work until his first postop appointment on 12/26/17  Brenda Avendano PA-C        Signatures   Electronically signed by : Brenda Avendano, [facility]; Nov 27 2017 11:17AM EST                       (Author)

## 2018-01-14 VITALS
HEART RATE: 87 BPM | BODY MASS INDEX: 31.16 KG/M2 | SYSTOLIC BLOOD PRESSURE: 152 MMHG | DIASTOLIC BLOOD PRESSURE: 81 MMHG | HEIGHT: 65 IN | WEIGHT: 187 LBS

## 2018-01-15 ENCOUNTER — APPOINTMENT (OUTPATIENT)
Dept: PHYSICAL THERAPY | Facility: OTHER | Age: 53
End: 2018-01-15
Payer: COMMERCIAL

## 2018-01-15 PROCEDURE — 97110 THERAPEUTIC EXERCISES: CPT

## 2018-01-15 PROCEDURE — 97140 MANUAL THERAPY 1/> REGIONS: CPT

## 2018-01-16 ENCOUNTER — APPOINTMENT (OUTPATIENT)
Dept: PHYSICAL THERAPY | Facility: OTHER | Age: 53
End: 2018-01-16
Payer: COMMERCIAL

## 2018-01-16 PROCEDURE — 97010 HOT OR COLD PACKS THERAPY: CPT

## 2018-01-16 PROCEDURE — 97140 MANUAL THERAPY 1/> REGIONS: CPT

## 2018-01-16 PROCEDURE — 97110 THERAPEUTIC EXERCISES: CPT

## 2018-01-18 ENCOUNTER — APPOINTMENT (OUTPATIENT)
Dept: PHYSICAL THERAPY | Facility: OTHER | Age: 53
End: 2018-01-18
Payer: COMMERCIAL

## 2018-01-18 PROCEDURE — 97112 NEUROMUSCULAR REEDUCATION: CPT

## 2018-01-18 PROCEDURE — 97140 MANUAL THERAPY 1/> REGIONS: CPT

## 2018-01-22 ENCOUNTER — APPOINTMENT (OUTPATIENT)
Dept: PHYSICAL THERAPY | Facility: OTHER | Age: 53
End: 2018-01-22
Payer: COMMERCIAL

## 2018-01-22 VITALS
SYSTOLIC BLOOD PRESSURE: 160 MMHG | BODY MASS INDEX: 31.16 KG/M2 | DIASTOLIC BLOOD PRESSURE: 98 MMHG | HEIGHT: 65 IN | WEIGHT: 187 LBS | HEART RATE: 72 BPM

## 2018-01-22 PROCEDURE — 97140 MANUAL THERAPY 1/> REGIONS: CPT

## 2018-01-22 PROCEDURE — 97010 HOT OR COLD PACKS THERAPY: CPT

## 2018-01-22 PROCEDURE — 97112 NEUROMUSCULAR REEDUCATION: CPT

## 2018-01-23 VITALS
SYSTOLIC BLOOD PRESSURE: 159 MMHG | DIASTOLIC BLOOD PRESSURE: 96 MMHG | BODY MASS INDEX: 31.16 KG/M2 | HEIGHT: 65 IN | WEIGHT: 187 LBS | HEART RATE: 82 BPM

## 2018-01-23 NOTE — MISCELLANEOUS
Message  Return to work or school:   Ileana Parker is under my professional care  He was seen in my office on 12/26/17  He is not able to return to work until 1/29/18        Nacho Cosme MD       Signatures   Electronically signed by : ERAN Burciaga ; Dec 26 2017 10:17AM EST                       (Author)

## 2018-01-24 ENCOUNTER — OFFICE VISIT (OUTPATIENT)
Dept: PHYSICAL THERAPY | Facility: OTHER | Age: 53
End: 2018-01-24
Payer: COMMERCIAL

## 2018-01-24 DIAGNOSIS — M25.511 ACUTE PAIN OF RIGHT SHOULDER: ICD-10-CM

## 2018-01-24 DIAGNOSIS — Z51.89 ENCOUNTER FOR AFTERCARE: Primary | ICD-10-CM

## 2018-01-24 PROCEDURE — 97140 MANUAL THERAPY 1/> REGIONS: CPT | Performed by: PHYSICAL THERAPIST

## 2018-01-24 PROCEDURE — 97110 THERAPEUTIC EXERCISES: CPT | Performed by: PHYSICAL THERAPIST

## 2018-01-25 NOTE — PROGRESS NOTES
Daily Note  Today's date: 2018  Patient name: Gabriele Boggs  : 1965  MRN: [id number]  Referring provider: Kwesi Mccord MD  Dx:   Encounter Diagnoses   Name Primary?  Encounter for aftercare Yes    Acute pain of right shoulder      Subjective: Patient reports increased pain post session last visit  Patient reports pain is a 5/10  He told PT that it felt much better after today's session  PT added thoracic extension, UT stretch and scap protraction  Objective: See treatment diary below  Assessment: Tolerated treatment well  Patient demonstrated fatigue post treatment, exhibited good technqiue with therapeutic exercises and could benefit from continued PT  Plan: Continue per plan of care     Precautions: see Dr Reyna Has Cuff Repair Protocol     Daily Treatment Diary   Manual           R sh PROM  x         Inf/post glides   G1 x                                           Exercise Diary           scap squeezes  20 x 5"  x         scap protraction  20 x 5"  x         cervical AROM x         UT stretch  10 x 10"  x         LS stretch NV         thoracic ext  10 x 10"  x         Pendulums AP  2'  x         Pendulums ML  2'  x         Pendulums CC  2' x         Pendulums CW HOLD         "fish tails"   1 x 30 x         Elbow flex/ext PROM  1 x 30 x          - green  1 x 30 x                   pulleys          Finger ladder flex          Finger ladder scaption          Table slides flexion          Table slides scaption          Table slides ER          Supine cane flexion          Supine cane scaption          Supine cane ER          Shoulder Iso x 6              Modalities           CP PRN x

## 2018-01-29 ENCOUNTER — OFFICE VISIT (OUTPATIENT)
Dept: PHYSICAL THERAPY | Facility: OTHER | Age: 53
End: 2018-01-29
Payer: COMMERCIAL

## 2018-01-29 DIAGNOSIS — M25.511 ACUTE PAIN OF RIGHT SHOULDER: ICD-10-CM

## 2018-01-29 DIAGNOSIS — Z51.89 ENCOUNTER FOR AFTERCARE: Primary | ICD-10-CM

## 2018-01-29 PROCEDURE — 97110 THERAPEUTIC EXERCISES: CPT | Performed by: PHYSICAL THERAPIST

## 2018-01-29 PROCEDURE — 97140 MANUAL THERAPY 1/> REGIONS: CPT | Performed by: PHYSICAL THERAPIST

## 2018-01-29 PROCEDURE — 97112 NEUROMUSCULAR REEDUCATION: CPT | Performed by: PHYSICAL THERAPIST

## 2018-01-30 ENCOUNTER — OFFICE VISIT (OUTPATIENT)
Dept: OBGYN CLINIC | Facility: CLINIC | Age: 53
End: 2018-01-30

## 2018-01-30 VITALS
DIASTOLIC BLOOD PRESSURE: 93 MMHG | WEIGHT: 180 LBS | HEART RATE: 76 BPM | SYSTOLIC BLOOD PRESSURE: 151 MMHG | HEIGHT: 65 IN | BODY MASS INDEX: 29.99 KG/M2

## 2018-01-30 DIAGNOSIS — S46.011D TRAUMATIC COMPLETE TEAR OF RIGHT ROTATOR CUFF, SUBSEQUENT ENCOUNTER: Primary | ICD-10-CM

## 2018-01-30 PROBLEM — E11.9 DIABETES MELLITUS WITH NO COMPLICATION (HCC): Chronic | Status: ACTIVE | Noted: 2017-12-12

## 2018-01-30 PROBLEM — E11.9 DIABETES MELLITUS WITH NO COMPLICATION (HCC): Status: ACTIVE | Noted: 2017-12-12

## 2018-01-30 PROCEDURE — 99024 POSTOP FOLLOW-UP VISIT: CPT | Performed by: ORTHOPAEDIC SURGERY

## 2018-01-30 RX ORDER — TRAMADOL HYDROCHLORIDE 50 MG/1
50 TABLET ORAL EVERY 8 HOURS
COMMUNITY
Start: 2017-11-27 | End: 2018-01-30

## 2018-01-30 RX ORDER — OXYCODONE HYDROCHLORIDE 5 MG/1
CAPSULE ORAL
COMMUNITY
Start: 2017-12-26 | End: 2018-01-30

## 2018-01-30 RX ORDER — NAPROXEN 500 MG/1
500 TABLET ORAL
COMMUNITY
Start: 2017-10-27 | End: 2018-01-30

## 2018-01-30 RX ORDER — LANCETS 30 GAUGE
EACH MISCELLANEOUS
COMMUNITY
Start: 2016-06-08

## 2018-01-30 NOTE — PROGRESS NOTES
Daily Note  Today's date: 2018  Patient name: Trupti Staley  : 1965  MRN: [id number]  Referring provider: Phu Morrison MD  Dx:   Encounter Diagnoses   Name Primary?  Encounter for aftercare Yes    Acute pain of right shoulder      Subjective: Patient will be seeing MD tomorrow  PT will be performing RE in the near future  PT added AAROM flexion and AAROM scaption (table slides)  Patient tolerated this well  PT also administered laser today with positive results  Objective: See treatment diary below  Assessment: Tolerated treatment well  Patient is limited in ROM at this time  He began PT 2 weeks late (6 weeks out)  PT progressed him today  Continue to progress patient  He is tight in his shoulder but with time ROM will improve  Plan: Continue per plan of care     Precautions: see Dr Chris Browning Cuff Repair Protocol     Daily Treatment Diary   Manual          R sh PROM  x 10'        Inf/post glides   G1 x 2'         Roller   5'                                 Exercise Diary          scap squeezes  20 x 5"  x 2'         scap protraction  20 x 5"  x 2'         cervical AROM x 15 ea        UT stretch  10 x 10"  x 10 x 10"         LS stretch NV NV        thoracic ext  10 x 10"  x 10 x 10"         Pendulums AP  2'  x 2'         Pendulums ML  2'  x 2'         Pendulums CC  2' x 2'         Pendulums CW HOLD HOLD        "fish tails"   1 x 30 x 2'         Elbow flex/ext PROM  1 x 30 x 2'          - green  1 x 30 x 2'                   pulleys          Finger ladder flex          Finger ladder scaption          Table slides flexion  10 x 10"        Table slides scaption  10 x 10"         Table slides ER          Supine cane flexion          Supine cane scaption          Supine cane ER          Shoulder Iso x 6              Modalities          CP PRN x         Laser  4'

## 2018-01-30 NOTE — PROGRESS NOTES
Patient Name:  Curlene Goodell  MRN:  [id number]    Assessment & Plan    Right shoulder arthroscopic rotator cuff repair and biceps tenodesis 12/18/17  Continue physical therapy according to the protocol  Gradually wean from sling as tolerated over the next 2 weeks  Discontinue oxycodone and tramadol  Recommended Tylenol or NSAIDs as needed for pain  Follow-up in 5-6 weeks  Subjective    Postop evaluation after arthroscopic rotator cuff repair and biceps tenodesis on 12/18/17  Patient remains in the sling  He is participating in physical therapy  He reports an audible popping episode 2 days ago at home with no significant pain at that time  He does report occasional episodes of moderate to severe pain for which he takes oxycodone or tramadol  Objective    /93   Pulse 76   Ht 5' 4 5" (1 638 m)   Wt 81 6 kg (180 lb)   BMI 30 42 kg/m²     Right shoulder:  Incisions are healed  No erythema  No gross deformity  Mild tenderness to palpation lateral deltoid  Passive range of motion is forward flexion 95°, external rotation-abduction 30°, and internal rotation-abduction 0°, with pain at the limits of motion in all planes  2+ radial pulse

## 2018-01-30 NOTE — LETTER
January 30, 2018     Patient: Corby Otto   YOB: 1965   Date of Visit: 1/30/2018       To Whom it May Concern:    Nurys Jenkins is under my professional care for a right rotator cuff tear  He was seen in my office on 1/30/2018  He is cleared to return to work with restrictions consisting of sedentary duty with no repetitive use of the right upper extremity, maximum lifting 1 pound with the right upper extremity, no reaching, pushing, pulling, or overhead work with the right upper extremity  I estimate a minimum of 6 months from the surgery date (12/18/17) for return to full duty, and his prognosis is good for full recovery of right upper extremity function  If you have any questions or concerns, please don't hesitate to call           Sincerely,          Gemrán Campoverde MD

## 2018-01-31 ENCOUNTER — OFFICE VISIT (OUTPATIENT)
Dept: PHYSICAL THERAPY | Facility: OTHER | Age: 53
End: 2018-01-31
Payer: COMMERCIAL

## 2018-01-31 DIAGNOSIS — Z51.89 ENCOUNTER FOR AFTERCARE: Primary | ICD-10-CM

## 2018-01-31 DIAGNOSIS — M25.511 ACUTE PAIN OF RIGHT SHOULDER: ICD-10-CM

## 2018-01-31 PROCEDURE — 97140 MANUAL THERAPY 1/> REGIONS: CPT | Performed by: PHYSICAL THERAPIST

## 2018-01-31 PROCEDURE — 97112 NEUROMUSCULAR REEDUCATION: CPT | Performed by: PHYSICAL THERAPIST

## 2018-01-31 PROCEDURE — 97110 THERAPEUTIC EXERCISES: CPT | Performed by: PHYSICAL THERAPIST

## 2018-02-01 NOTE — PROGRESS NOTES
Daily Note  Today's date: 2018  Patient name: Kash Dixon  : 1965  MRN: [id number]  Referring provider: Alisson Peralta MD  Dx:   Encounter Diagnoses   Name Primary?  Encounter for aftercare Yes    Acute pain of right shoulder      1 on 1  5:25-6:05PM  Subjective: Patient told PT MD was happy with his progress  He was told to continue with PT and he was given a note saying he could return to work as long as it is modified  Objective: See treatment diary below  Assessment: Physical therapist provided patient with an updated HEP  Continue to add new exercises per protocol  No new issues reported  Patient responds favorably to laser  Plan: Continue per plan of care     Precautions: see Dr Garth Epps Cuff Repair Protocol     Daily Treatment Diary   Manual         R sh PROM  x 10' 10'       Inf/post glides   G1 x 2'         Roller   5'  5'                                Exercise Diary         scap squeezes  20 x 5"  x 2'  2'       scap protraction  20 x 5"  x 2'  2'       cervical AROM x 15 ea 15 ea       UT stretch  10 x 10"  x 10 x 10"  10 x 10"       LS stretch NV NV 10 x 10"       thoracic ext  10 x 10"  x 10 x 10"  10 x 10"       Pendulums AP  2'  x 2'  2'       Pendulums ML  2'  x 2'  2'       Pendulums CC  2' x 2'  2'       Pendulums CW HOLD HOLD HOLD       "fish tails"   1 x 30 x 2'  2'       Elbow flex/ext PROM  1 x 30 x 2'  2'         - green  1 x 30 x 2'  2'                 pulleys          Finger ladder flex          Finger ladder scaption          Table slides flexion  10 x 10" 10 x 10"       Table slides scaption  10 x 10"  10 x 10"       Table slides ER   10 x 10"       Supine cane flexion          Supine cane scaption          Supine cane ER          Shoulder Iso x 6              Modalities         CP PRN x         Laser  4'  4'

## 2018-02-05 ENCOUNTER — OFFICE VISIT (OUTPATIENT)
Dept: PHYSICAL THERAPY | Facility: OTHER | Age: 53
End: 2018-02-05
Payer: COMMERCIAL

## 2018-02-05 DIAGNOSIS — M25.511 ACUTE PAIN OF RIGHT SHOULDER: ICD-10-CM

## 2018-02-05 DIAGNOSIS — Z51.89 ENCOUNTER FOR AFTERCARE: Primary | ICD-10-CM

## 2018-02-05 PROCEDURE — 97112 NEUROMUSCULAR REEDUCATION: CPT

## 2018-02-05 PROCEDURE — 97140 MANUAL THERAPY 1/> REGIONS: CPT

## 2018-02-05 PROCEDURE — 97110 THERAPEUTIC EXERCISES: CPT

## 2018-02-05 NOTE — PROGRESS NOTES
Daily Note  Today's date: 2018  Patient name: Allison Borrego  : 1965  MRN: [id number]  Referring provider: Capri Montes MD  Dx:   Encounter Diagnoses   Name Primary?  Encounter for aftercare Yes    Acute pain of right shoulder      1 on 1  5:00-5:40 PM  Subjective: Patient reports feeling pain for 2 days after progression last session which forced him to old traditional Edwina due to pain  1/10 shoulder pain currently  Patient does tolerate temporary relief from laser  Objective: See treatment diary below    Assessment: Advised and educated patient to only push stretches and ROM at home and in clinic to a tolerable (<7/10) range  Demonstrates observable tension in b/l Uts  Able to add pulleys for short duration with good tolerance  Plan: Continue per plan of care       Precautions: see Dr Ksenia Capellan Cuff Repair Protocol     Daily Treatment Diary   Manual   2/5      R sh PROM  x 10' 10' x10'      Inf/post glides   G1 x 2'         Roller   5'  5'                                Exercise Diary   25      scap squeezes  20 x 5"  x 2'  2' 2'      scap protraction  20 x 5"  x 2'  2' 2'      cervical AROM x 15 ea 15 ea       UT stretch  10 x 10"  x 10 x 10"  10 x 10"       LS stretch NV NV 10 x 10"       thoracic ext  10 x 10"  x 10 x 10"  10 x 10" 10"x10      Pendulums AP  2'  x 2'  2' 2'      Pendulums ML  2'  x 2'  2' 2'      Pendulums CC  2' x 2'  2' 2'      Pendulums CW HOLD HOLD HOLD       "fish tails"   1 x 30 x 2'  2' 2'      Elbow flex/ext PROM  1 x 30 x 2'  2'  2'       - green  1 x 30 x 2'  2'                 pulleys    flex 10"x2'      Finger ladder flex          Finger ladder scaption          Table slides flexion  10 x 10" 10 x 10" 10"x10      Table slides scaption  10 x 10"  10 x 10" 10"x10      Table slides ER   10 x 10" 10"x10      Supine cane flexion          Supine cane scaption          Supine cane ER          Shoulder Iso x 6 Modalities  1/24 1/29 1/31 2/5      CP PRN x         Laser  4'  4'  4'

## 2018-02-07 ENCOUNTER — APPOINTMENT (OUTPATIENT)
Dept: PHYSICAL THERAPY | Facility: OTHER | Age: 53
End: 2018-02-07
Payer: COMMERCIAL

## 2018-02-15 ENCOUNTER — OFFICE VISIT (OUTPATIENT)
Dept: PHYSICAL THERAPY | Facility: OTHER | Age: 53
End: 2018-02-15
Payer: COMMERCIAL

## 2018-02-15 DIAGNOSIS — M25.511 ACUTE PAIN OF RIGHT SHOULDER: ICD-10-CM

## 2018-02-15 DIAGNOSIS — Z51.89 ENCOUNTER FOR AFTERCARE: Primary | ICD-10-CM

## 2018-02-15 PROCEDURE — 97112 NEUROMUSCULAR REEDUCATION: CPT | Performed by: PHYSICAL THERAPIST

## 2018-02-15 PROCEDURE — 97140 MANUAL THERAPY 1/> REGIONS: CPT | Performed by: PHYSICAL THERAPIST

## 2018-02-15 PROCEDURE — 97530 THERAPEUTIC ACTIVITIES: CPT | Performed by: PHYSICAL THERAPIST

## 2018-02-15 NOTE — PROGRESS NOTES
Daily Note  Today's date: 2/15/2018  Patient name: Sanchez Jarrett  : 1965  MRN: [id number]  Referring provider: Marco A Guevara MD  Dx:   Encounter Diagnoses   Name Primary?  Encounter for aftercare Yes    Acute pain of right shoulder      1 on 1  11:15AM-12:00PM  CP  11:55AM-12:00PM  Subjective: Patient reports increased pain prior to the start of today's session  He told PT he was a little sore because he has been leaning on his involved elbow  Objective: See treatment diary below    Assessment: Patient tolerated new exercises very well  PT progressed him per protocol  He is a little behind due to fact patient started PT later than directed  He is doing well but presents with limited ROM and a low pain tolerance  Monitor response nv  Plan: Continue per plan of care       Precautions: see Dr Andrea Henry Cuff Repair Protocol     Daily Treatment Diary   Manual  1/24 1/29 1/31 2/5 2/15     R sh PROM  x 10' 10' x10' [facility] [place]     Inf/post glides   G1 x 2'    [facility] [place]     Roller   5'  5'   [facility] [place]                             Exercise Diary  15     scap squeezes  20 x 5"  x 2'  2' 2'      scap protraction  20 x 5"  x 2'  2' 2'      cervical AROM x 15 ea 15 ea       UT stretch  10 x 10"  x 10 x 10"  10 x 10"       LS stretch NV NV 10 x 10"       thoracic ext  10 x 10"  x 10 x 10"  10 x 10" 10"x10      Pendulums AP  2'  x 2'  2' 2'      Pendulums ML  2'  x 2'  2' 2'      Pendulums CC  2' x 2'  2' 2'      Pendulums CW HOLD HOLD HOLD       "fish tails"   1 x 30 x 2'  2' 2'      Elbow flex/ext PROM  1 x 30 x 2'  2'  2'       - green  1 x 30 x 2'  2'                 pulleys    flex 10"x2' 5'      Finger ladder flex     10 x 10"     Finger ladder scaption     10 x 10"      Table slides flexion  10 x 10" 10 x 10" 10"x10      Table slides scaption  10 x 10"  10 x 10" 10"x10      Table slides ER   10 x 10" 10"x10      Supine cane flexion     10 x 10"     Supine cane scaption          Supine cane ER     10 x 10"     Shoulder Iso x 6     10 x 5" ea         Modalities  1/24 1/29 1/31 2/5 2/15     CP PRN x    5'      Laser  4'  4'  4'

## 2018-02-16 ENCOUNTER — OFFICE VISIT (OUTPATIENT)
Dept: PHYSICAL THERAPY | Facility: OTHER | Age: 53
End: 2018-02-16
Payer: COMMERCIAL

## 2018-02-16 DIAGNOSIS — Z51.89 ENCOUNTER FOR AFTERCARE: Primary | ICD-10-CM

## 2018-02-16 DIAGNOSIS — M25.511 ACUTE PAIN OF RIGHT SHOULDER: ICD-10-CM

## 2018-02-16 PROCEDURE — 97112 NEUROMUSCULAR REEDUCATION: CPT | Performed by: PEDIATRICS

## 2018-02-16 PROCEDURE — 97110 THERAPEUTIC EXERCISES: CPT | Performed by: PEDIATRICS

## 2018-02-16 PROCEDURE — 97140 MANUAL THERAPY 1/> REGIONS: CPT | Performed by: PEDIATRICS

## 2018-02-16 NOTE — PROGRESS NOTES
Daily Note     Today's date: 2018  Patient name: Julio Guzman  : 1965  MRN: [id number]  Referring provider: Brittney Astorga MD  Dx:   Encounter Diagnoses   Name Primary?  Encounter for aftercare Yes    Acute pain of right shoulder         1:1 with PTA 1000 - 10:40  IEP 10:40 - 10:55          Subjective: Pt  Rates pain 3/10 at start of treatment session  Objective: See treatment diary below  Precautions: see Dr Lady Campbell Cuff Repair Protocol     Daily Treatment Diary   Manual  1/24 1/29 1/31 2/5 2/15 2/16    R sh PROM  x 10' 10' x10' [facility]     Inf/post glides   G1 x 2'    [facility]     Roller   5'  5'   [facility]                             Exercise Diary  1/24 1/29 1/31 2/5 2/15 2/16    scap squeezes  20 x 5"  x 2'  2' 2'  D/c    scap protraction  20 x 5"  x 2'  2' 2'  D/c    cervical AROM x 15 ea 15 ea   D/c    UT stretch  10 x 10"  x 10 x 10"  10 x 10"   D/c    LS stretch NV NV 10 x 10"   D/c    thoracic ext  10 x 10"  x 10 x 10"  10 x 10" 10"x10  D/c    Pendulums AP  2'  x 2'  2' 2'  D/c    Pendulums ML  2'  x 2'  2' 2'  D/c    Pendulums CC  2' x 2'  2' 2'  D/c    Pendulums CW HOLD HOLD HOLD   D/c    "fish tails"   1 x 30 x 2'  2' 2'  D/c    Elbow flex/ext PROM  1 x 30 x 2'  2'  2'  D/c     - green  1 x 30 x 2'  2'   D/c              pulleys    flex 10"x2' 5'  5'    Finger ladder flex     10 x 10" 10" x 10    Finger ladder scaption     10 x 10"  10" x 10    Table slides flexion  10 x 10" 10 x 10" 10"x10  10" x 10    Table slides scaption  10 x 10"  10 x 10" 10"x10  10" x 10    Table slides ER   10 x 10" 10"x10  10" x 10    Supine cane flexion     10 x 10" 10" x 10    Supine cane scaption          Supine cane ER     10 x 10" 10" x 10    Shoulder Iso x 6     10 x 5" ea 10 x 5" ea        Modalities  1/24 1/29 1/31 2/5 2/15 2/16    CP PRN x    5'  10'    Laser  4'  4'  4'                      Assessment: Tolerated treatment well  Patient exhibited good technique with therapeutic exercises   ROM is improving  Pt  Demonstrates good knowledge of current protocol status  Plan: Progress treament per protocol

## 2018-02-19 ENCOUNTER — OFFICE VISIT (OUTPATIENT)
Dept: PHYSICAL THERAPY | Facility: OTHER | Age: 53
End: 2018-02-19
Payer: COMMERCIAL

## 2018-02-19 DIAGNOSIS — Z51.89 ENCOUNTER FOR AFTERCARE: Primary | ICD-10-CM

## 2018-02-19 DIAGNOSIS — M25.511 ACUTE PAIN OF RIGHT SHOULDER: ICD-10-CM

## 2018-02-19 PROCEDURE — 97140 MANUAL THERAPY 1/> REGIONS: CPT

## 2018-02-19 PROCEDURE — 97112 NEUROMUSCULAR REEDUCATION: CPT

## 2018-02-19 PROCEDURE — 97110 THERAPEUTIC EXERCISES: CPT

## 2018-02-19 NOTE — PROGRESS NOTES
Daily Note     Today's date: 2018  Patient name: Jensen Winter  : 1965  MRN: [id number]  Referring provider: Nikki Hinton MD  Dx:   Encounter Diagnoses   Name Primary?  Encounter for aftercare Yes    Acute pain of right shoulder            1 on 1 entire session with PTA       Subjective: Patient reports throbbing, constant discomfort in shoulder all day today  Objective: See treatment diary below  Precautions: see Dr Abi Darby Cuff Repair Protocol     Daily Treatment Diary   Manual  1/24 1/29 1/31 2/5 2/15 2/16 2/19   R sh PROM  x 10' 10' x10' [facility]     Inf/post glides   G1 x 2'    [facility]     Roller   5'  5'   [facility]                             Exercise Diary  1/24 1/29 1/31 2/5 2/15 2/16 2/19             pulleys    flex 10"x2' 5'  5' 5'   Finger ladder flex     10 x 10" 10" x 10 10"x10   Finger ladder scaption     10 x 10"  10" x 10 10"x10   Table slides flexion  10 x 10" 10 x 10" 10"x10  10" x 10 10"x10   Table slides scaption  10 x 10"  10 x 10" 10"x10  10" x 10 10"x10   Table slides ER   10 x 10" 10"x10  10" x 10 10"x10   Supine cane flexion     10 x 10" 10" x 10 10"x10   Supine cane scaption      10"x10 10"x10   Supine cane ER     10 x 10" 10" x 10 10"x10   Shoulder Iso x 6     10 x 5" ea 10 x 5" ea 5" x10 ea  Modalities  1/24 1/29 1/31 2/5 2/15 2/16 2/19   CP PRN x    5'  10'    Laser  4'  4'  4'   NP                   Assessment: Tolerated treatment well  Patient exhibited good technique with therapeutic exercises  Verbal cues to avoid compensation during wall climbs  Plan: Progress treament per protocol

## 2018-02-22 ENCOUNTER — OFFICE VISIT (OUTPATIENT)
Dept: PHYSICAL THERAPY | Facility: OTHER | Age: 53
End: 2018-02-22
Payer: COMMERCIAL

## 2018-02-22 DIAGNOSIS — Z51.89 ENCOUNTER FOR AFTERCARE: Primary | ICD-10-CM

## 2018-02-22 DIAGNOSIS — M25.511 ACUTE PAIN OF RIGHT SHOULDER: ICD-10-CM

## 2018-02-22 PROCEDURE — 97140 MANUAL THERAPY 1/> REGIONS: CPT | Performed by: PEDIATRICS

## 2018-02-22 PROCEDURE — 97110 THERAPEUTIC EXERCISES: CPT | Performed by: PEDIATRICS

## 2018-02-22 PROCEDURE — 97112 NEUROMUSCULAR REEDUCATION: CPT | Performed by: PEDIATRICS

## 2018-02-22 NOTE — PROGRESS NOTES
Daily Note     Today's date: 2018  Patient name: Nghia Farrar  : 1965  MRN: [id number]  Referring provider: Mian Coles MD  Dx:   Encounter Diagnosis     ICD-10-CM    1  Encounter for aftercare Z51 89    2  Acute pain of right shoulder M25 511         IEP 8929 - 0903 and 1019 - 1030  1:1 7912 - 6416          Subjective: Pt  Reports he feels his shoulder "slip out and back in" on a few occassions  Pt  Is unable to specify motions that trigger this sensation  Objective: See treatment diary below  Precautions: see Dr White Bradly Cuff Repair Protocol     Daily Treatment Diary   Manual           R sh PROM  10         Inf/post glides   G1          Roller                                   Exercise Diary                     Pulleys " 5'         Finger ladder flex 10" x 10         Finger ladder scaption 10" x 10         Table slides flexion 10" x 10         Table slides scaption 10" x 10         Table slides ER 10" x 10         Supine cane flexion 10" x 10         Supine cane scaption 10" x 10         Supine cane ER 10" x 10         Shoulder Iso x 6 5" x 10             Modalities           CP PRN 5'         Laser                          Assessment: Tolerated treatment well  PT assessed pt  Shoulder with no significant findings  Continued with POC as outlined  Pt  Did not report "slipping" sensation durring treatment session  Instructed patient to perform [address] isometrics at home 1x daily  Plan: Continue per plan of care  Progress treament per protocol

## 2018-02-26 ENCOUNTER — OFFICE VISIT (OUTPATIENT)
Dept: PHYSICAL THERAPY | Facility: OTHER | Age: 53
End: 2018-02-26
Payer: COMMERCIAL

## 2018-02-26 DIAGNOSIS — M25.511 ACUTE PAIN OF RIGHT SHOULDER: ICD-10-CM

## 2018-02-26 DIAGNOSIS — Z51.89 ENCOUNTER FOR AFTERCARE: Primary | ICD-10-CM

## 2018-02-26 PROCEDURE — 97110 THERAPEUTIC EXERCISES: CPT | Performed by: PEDIATRICS

## 2018-02-26 PROCEDURE — 97112 NEUROMUSCULAR REEDUCATION: CPT | Performed by: PEDIATRICS

## 2018-02-26 PROCEDURE — 97140 MANUAL THERAPY 1/> REGIONS: CPT | Performed by: PEDIATRICS

## 2018-02-26 NOTE — PROGRESS NOTES
Daily Note     Today's date: 2018  Patient name: Paul Stein  : 1965  MRN: [id number]  Referring provider: Swapna Ryan MD  Dx:   Encounter Diagnosis     ICD-10-CM    1  Encounter for aftercare Z51 89    2  Acute pain of right shoulder M25 511            1:1 with PTA EW for duration of treatment session  Subjective: Pt  Reports he has not had the "dislocation" sensation since last visit  He reports he shoulder feels more stable  Objective: See treatment diary below  Precautions: see Dr Deondre Rowan Repair Protocol     Daily Treatment Diary   Manual          R sh PROM  10 10'        Inf/post glides   G1          Roller                                   Exercise Diary                     Pulleys " 5' 5'        Finger ladder flex 10" x 10 10" x 10        Finger ladder scaption 10" x 10 10" x 10        Table slides flexion 10" x 10 10" x 10        Table slides scaption 10" x 10 10" x 10        Table slides ER 10" x 10 10" x 10        Supine cane flexion 10" x 10 10" x 10        Supine cane scaption 10" x 10 10" x 10        Supine cane ER 10" x 10 10" x 10        Shoulder Iso x 6 5" x 10 5" x 10            Modalities          CP PRN 5' 5'        Laser                          Assessment: Tolerated treatment well  ROM is improving but moderate restrictions persist in all cardinal planes of motion  Patient would benefit from continued PT      Plan: Progress treament per protocol

## 2018-02-28 ENCOUNTER — OFFICE VISIT (OUTPATIENT)
Dept: PHYSICAL THERAPY | Facility: OTHER | Age: 53
End: 2018-02-28
Payer: COMMERCIAL

## 2018-02-28 DIAGNOSIS — M25.511 ACUTE PAIN OF RIGHT SHOULDER: ICD-10-CM

## 2018-02-28 DIAGNOSIS — Z51.89 ENCOUNTER FOR AFTERCARE: Primary | ICD-10-CM

## 2018-02-28 PROCEDURE — 97140 MANUAL THERAPY 1/> REGIONS: CPT

## 2018-02-28 PROCEDURE — 97110 THERAPEUTIC EXERCISES: CPT

## 2018-02-28 PROCEDURE — 97112 NEUROMUSCULAR REEDUCATION: CPT

## 2018-02-28 NOTE — PROGRESS NOTES
Daily Note     Today's date: 2018  Patient name: Danika Howard  : 1965  MRN: [id number]  Referring provider: James Lozoya MD  Dx:   Encounter Diagnosis     ICD-10-CM    1  Encounter for aftercare Z51 89    2  Acute pain of right shoulder M25 511                   Subjective: Patient reports that he has minimal pain this afternoon noting a 1/10 VAS  Objective: See treatment diary below      Assessment:  Patient demonstrated little difficulty with today's treatment session but noted a mild increase in pain with ABD isometrics  He noted sx reduction post gentle PROM and modalities  Patient would benefit from continued skilled PT intervention as per the post operative protocol  Plan: Continue PT as per plan of care and patient tolerance      Precautions: see Dr Childers Edmond Repair Protocol      Daily Treatment Diary   Manual             R sh PROM  10 10'  AS           Inf/post glides   G1                 Roller                                                            Exercise Diary                                    Pulleys " 5' 5'  5'           Finger ladder flex 10" x 10 10" x 10  10x10''           Finger ladder scaption 10" x 10 10" x 10  10x10''           Table slides flexion 10" x 10 10" x 10  10x10''           Table slides scaption 10" x 10 10" x 10  10x10''           Table slides ER 10" x 10 10" x 10  10x10''           Supine cane flexion 10" x 10 10" x 10  10x10''           Supine cane scaption 10" x 10 10" x 10  10x10''           Supine cane ER 10" x 10 10" x 10  10x10''           Shoulder Iso x 6 5" x 10 5" x 10  10x5''                 Modalities             CP PRN 5' 5'  10'           Laser

## 2018-03-05 ENCOUNTER — OFFICE VISIT (OUTPATIENT)
Dept: PHYSICAL THERAPY | Facility: OTHER | Age: 53
End: 2018-03-05
Payer: COMMERCIAL

## 2018-03-05 DIAGNOSIS — Z51.89 ENCOUNTER FOR AFTERCARE: Primary | ICD-10-CM

## 2018-03-05 DIAGNOSIS — M25.511 ACUTE PAIN OF RIGHT SHOULDER: ICD-10-CM

## 2018-03-05 PROCEDURE — 97112 NEUROMUSCULAR REEDUCATION: CPT

## 2018-03-05 PROCEDURE — 97110 THERAPEUTIC EXERCISES: CPT

## 2018-03-05 PROCEDURE — 97140 MANUAL THERAPY 1/> REGIONS: CPT

## 2018-03-05 NOTE — PROGRESS NOTES
Daily Note     Today's date: 3/5/2018  Patient name: Danika Howard  : 1965  MRN: [id number]  Referring provider: James Lozoya MD  Dx:   Encounter Diagnosis     ICD-10-CM    1  Encounter for aftercare Z51 89    2  Acute pain of right shoulder M25 511            1 on 1 entire session with PTA       Subjective: Patient reports pain is less and less  Objective: See treatment diary below      Assessment:  Patient tolerated treatment well and had minimal discomfort with isometrics today  Patient would benefit from continued skilled PT intervention as per the post operative protocol  Most limited with passive Ir  Plan: Continue PT as per plan of care and patient tolerance      Precautions: see Dr Childers Stanton Repair Protocol      Daily Treatment Diary   Manual  2/22 2/26  2/28  3/5         R sh PROM  10 10'  AS  MP         Inf/post glides   G1                 Roller                                                            Exercise Diary       2/28  3/5                           Pulleys " 5' 5'  5'  5'         Finger ladder flex 10" x 10 10" x 10  10x10''  10"x10         Finger ladder scaption 10" x 10 10" x 10  10x10''  10"x10         Table slides flexion 10" x 10 10" x 10  10x10''  10"x10         Table slides scaption 10" x 10 10" x 10  10x10''  10"x10         Table slides ER 10" x 10 10" x 10  10x10''  10"x10         Supine cane flexion 10" x 10 10" x 10  10x10''  10"x10         Supine cane scaption 10" x 10 10" x 10  10x10''  10"x10         Supine cane ER 10" x 10 10" x 10  10x10''  10"x10         Shoulder Iso x 6 5" x 10 5" x 10  10x5''  10x5"               Modalities    3         CP PRN 5' 5'  10'  10'         Laser

## 2018-03-06 ENCOUNTER — OFFICE VISIT (OUTPATIENT)
Dept: OBGYN CLINIC | Facility: CLINIC | Age: 53
End: 2018-03-06

## 2018-03-06 VITALS
DIASTOLIC BLOOD PRESSURE: 86 MMHG | HEART RATE: 79 BPM | SYSTOLIC BLOOD PRESSURE: 133 MMHG | WEIGHT: 180 LBS | BODY MASS INDEX: 29.99 KG/M2 | HEIGHT: 65 IN

## 2018-03-06 DIAGNOSIS — S43.431D SUPERIOR GLENOID LABRUM LESION OF RIGHT SHOULDER, SUBSEQUENT ENCOUNTER: ICD-10-CM

## 2018-03-06 DIAGNOSIS — S46.011D TRAUMATIC COMPLETE TEAR OF RIGHT ROTATOR CUFF, SUBSEQUENT ENCOUNTER: Primary | ICD-10-CM

## 2018-03-06 PROBLEM — S43.431A SUPERIOR GLENOID LABRUM LESION OF RIGHT SHOULDER: Status: ACTIVE | Noted: 2018-03-06

## 2018-03-06 PROCEDURE — 99024 POSTOP FOLLOW-UP VISIT: CPT | Performed by: ORTHOPAEDIC SURGERY

## 2018-03-06 RX ORDER — OSELTAMIVIR PHOSPHATE 75 MG/1
CAPSULE ORAL
COMMUNITY
Start: 2018-02-16 | End: 2019-05-08

## 2018-03-06 NOTE — PROGRESS NOTES
Patient Name:  Danika Esteban  MRN:  [id number]    Assessment & Plan    Right shoulder arthroscopic rotator cuff repair and biceps tenodesis 12/18/17  1  Continue physical therapy  2  Follow-up in 6 weeks  Subjective    Postop evaluation after right shoulder arthroscopic rotator cuff repair and biceps tenodesis on 12/18/17  Patient reports gradual progress in physical therapy  He has some persistent stiffness, most notably with internal rotation  He had an episode of pain in the lateral aspect of his left shoulder recently  He has persistent mild discomfort in the right shoulder which is gradually improving  Objective    /86   Pulse 79   Ht 5' 4 5" (1 638 m)   Wt 81 6 kg (180 lb)   BMI 30 42 kg/m²     Right shoulder:  Nontender to palpation  Incisions are healed without erythema  No gross deformity  Range of motion is forward flexion 120 degrees/145 degrees, external rotation-abduction 60 degrees/90 degrees, and internal rotation-abduction 5 degrees/45 degrees  Impingement signs are negative  Empty can test is negative  Speed's test is negative  4/5 strength forward flexion

## 2018-03-07 ENCOUNTER — APPOINTMENT (OUTPATIENT)
Dept: PHYSICAL THERAPY | Facility: OTHER | Age: 53
End: 2018-03-07
Payer: COMMERCIAL

## 2018-03-09 ENCOUNTER — OFFICE VISIT (OUTPATIENT)
Dept: PHYSICAL THERAPY | Facility: OTHER | Age: 53
End: 2018-03-09
Payer: COMMERCIAL

## 2018-03-09 DIAGNOSIS — M25.511 ACUTE PAIN OF RIGHT SHOULDER: ICD-10-CM

## 2018-03-09 DIAGNOSIS — Z51.89 ENCOUNTER FOR AFTERCARE: Primary | ICD-10-CM

## 2018-03-09 PROCEDURE — 97140 MANUAL THERAPY 1/> REGIONS: CPT | Performed by: PHYSICAL THERAPIST

## 2018-03-09 PROCEDURE — 97112 NEUROMUSCULAR REEDUCATION: CPT | Performed by: PHYSICAL THERAPIST

## 2018-03-09 PROCEDURE — 97110 THERAPEUTIC EXERCISES: CPT | Performed by: PHYSICAL THERAPIST

## 2018-03-09 PROCEDURE — 97530 THERAPEUTIC ACTIVITIES: CPT | Performed by: PHYSICAL THERAPIST

## 2018-03-09 NOTE — PROGRESS NOTES
Daily Note     Today's date: 3/9/2018  Patient name: Julio Guzman  : 1965  MRN: [id number]  Referring provider: Brittney Astorga MD  Dx:   Encounter Diagnosis     ICD-10-CM    1  Encounter for aftercare Z51 89    2  Acute pain of right shoulder M25 511        Start Time: 1300  Stop Time: 1400  Total time in clinic (min): 60 minutes    Subjective: Patient reports increased pain since last PT session  He said he was doing a little snow removal and may have inflamed the shoulder  Objective: See treatment diary below      Assessment:  Patient tolerated treatment well  PT performed pain control exercises today  Plan: Continue PT as per plan of care and patient tolerance      Precautions: see Dr Fransisca Doty Repair Protocol      Daily Treatment Diary   Manual  2/22 2/26  2/28  3/5  3/9       R sh PROM  10 10'  AS  MP [address]       Inf/post glides   G1          GRC       Roller          [address]                                                 Exercise Diary       2/28  3/5  3/9                         Pulleys  5' 5'  5'  5'  5'        Finger ladder flex 10" x 10 10" x 10  10x10''  10"x10 10 x 10"        Finger ladder scaption 10" x 10 10" x 10  10x10''  10"x10 10 x 10"        Table slides flexion 10" x 10 10" x 10  10x10''  10"x10 10 x 10"        Table slides scaption 10" x 10 10" x 10  10x10''  10"x10 10 x 10"       Table slides ER 10" x 10 10" x 10  10x10''  10"x10 10 x 10"       Supine cane flexion 10" x 10 10" x 10  10x10''  10"x10  10 x 10"        Supine cane scaption 10" x 10 10" x 10  10x10''  10"x10  10 x 10"        Supine cane ER 10" x 10 10" x 10  10x10''  10"x10  10 x 10"        Shoulder Iso x 6 5" x 10 5" x 10  10x5''  10x5"  10 x 5"             Modalities  2/22 2/26  2/28  3/5         CP PRN 5' 5'  10'  10'         Laser

## 2018-03-15 ENCOUNTER — OFFICE VISIT (OUTPATIENT)
Dept: PHYSICAL THERAPY | Facility: OTHER | Age: 53
End: 2018-03-15
Payer: COMMERCIAL

## 2018-03-15 DIAGNOSIS — M25.511 ACUTE PAIN OF RIGHT SHOULDER: ICD-10-CM

## 2018-03-15 DIAGNOSIS — Z51.89 ENCOUNTER FOR AFTERCARE: Primary | ICD-10-CM

## 2018-03-15 PROCEDURE — 97140 MANUAL THERAPY 1/> REGIONS: CPT | Performed by: PEDIATRICS

## 2018-03-15 PROCEDURE — 97110 THERAPEUTIC EXERCISES: CPT | Performed by: PEDIATRICS

## 2018-03-15 PROCEDURE — 97112 NEUROMUSCULAR REEDUCATION: CPT | Performed by: PEDIATRICS

## 2018-03-15 NOTE — PROGRESS NOTES
Daily Note     Today's date: 3/15/2018  Patient name: Nghia Farrar  : 1965  MRN: [id number]  Referring provider: Mian Coles MD  Dx:   Encounter Diagnosis     ICD-10-CM    1  Encounter for aftercare Z51 89    2  Acute pain of right shoulder M25 511            1:1 with PTA for duration of treatment session  Subjective: Pt  Reports feeling aching in biceps on occassion  Objective: See treatment diary below  Precautions: see Dr Payne Course Repair Protocol      Daily Treatment Diary   Manual  2/22 2/26  2/28  3/5  3/9  3/15     R sh PROM  10 10'  AS  MP [address]  EW     Inf/post glides   G1          GRC       Roller          [address]                                                 Exercise Diary       2/28  3/5  3/9  3/15                       Pulleys  5' 5'  5'  5'  5'   5'     Finger ladder flex 10" x 10 10" x 10  10x10''  10"x10 10 x 10"   10" x 10     Finger ladder scaption 10" x 10 10" x 10  10x10''  10"x10 10 x 10"   10" x 10     Table slides flexion 10" x 10 10" x 10  10x10''  10"x10 10 x 10"   10" x 10     Table slides scaption 10" x 10 10" x 10  10x10''  10"x10 10 x 10"  10" x 10     Table slides ER 10" x 10 10" x 10  10x10''  10"x10 10 x 10" 10" x 10     Supine cane flexion 10" x 10 10" x 10  10x10''  10"x10  10 x 10"   10" x 10     Supine cane scaption 10" x 10 10" x 10  10x10''  10"x10  10 x 10"   10" x 10     Supine cane ER 10" x 10 10" x 10  10x10''  10"x10  10 x 10"   10" x 10     Shoulder Iso x 6 5" x 10 5" x 10  10x5''  10x5"  10 x 5"  10 x 5"           Modalities  2/22 2/26  2/28  3/5    3/15     CP PRN 5' 5'  10'  10'    10'     Laser                                         Assessment: Tolerated treatment well  Patient would benefit from continued PT      Plan: Continue per plan of care

## 2018-03-16 ENCOUNTER — OFFICE VISIT (OUTPATIENT)
Dept: PHYSICAL THERAPY | Facility: OTHER | Age: 53
End: 2018-03-16
Payer: COMMERCIAL

## 2018-03-16 DIAGNOSIS — M25.511 ACUTE PAIN OF RIGHT SHOULDER: ICD-10-CM

## 2018-03-16 DIAGNOSIS — Z51.89 ENCOUNTER FOR AFTERCARE: Primary | ICD-10-CM

## 2018-03-16 PROCEDURE — G8984 CARRY CURRENT STATUS: HCPCS | Performed by: PHYSICAL THERAPIST

## 2018-03-16 PROCEDURE — 97140 MANUAL THERAPY 1/> REGIONS: CPT | Performed by: PEDIATRICS

## 2018-03-16 PROCEDURE — G8985 CARRY GOAL STATUS: HCPCS | Performed by: PHYSICAL THERAPIST

## 2018-03-16 PROCEDURE — 97110 THERAPEUTIC EXERCISES: CPT | Performed by: PEDIATRICS

## 2018-03-16 PROCEDURE — 97112 NEUROMUSCULAR REEDUCATION: CPT | Performed by: PEDIATRICS

## 2018-03-16 NOTE — PROGRESS NOTES
PT Re-Evaluation  and PT Discharge    Today's date: 3/16/2018  Patient name: Carroll Doe  : 1965  MRN: [id number]  Referring provider: Velia Castanon MD  Dx:   Encounter Diagnosis     ICD-10-CM    1  Encounter for aftercare Z51 89    2  Acute pain of right shoulder M25 511        Start Time: 0900  Stop Time: 1000  Total time in clinic (min): 60 minutes    Assessment  Impairments: abnormal or restricted ROM, activity intolerance, impaired physical strength, lacks appropriate home exercise program and pain with function    Assessment details: Carroll Doe is a 50 y o  male who presents with complaints of acute pain of right shoulder due to encounter for aftercare (primary encounter diagnosis)  Patient will be transferring to [facility] in two weeks  PT updated HEP and provided this to him to hold him over until then  PT contacted [facility] as well  Spoke with Time Castorena  Thank you for your referral     Understanding of Dx/Px/POC: good   Prognosis: good    Goals  Short Term:  Pt will report decreased levels of pain by at least 2 subjective ratings in 4 weeks  Pt will demonstrate improved ROM by at least 10 degrees in 4 weeks  Pt will demonstrate improved strength by 1/2 grade  Long Term:   Pt will be independent in their HEP in 8 weeks  Pt will be be pain free with IADL's  Pt will demonstrate improved FOTO score   Patient's Goals:   "I want to be pain free"     Plan  Planned modality interventions: cryotherapy, TENS and thermotherapy: hydrocollator packs  Planned therapy interventions: abdominal trunk stabilization, flexibility, functional ROM exercises, home exercise program, patient education, therapeutic exercise, therapeutic activities, stretching, strengthening, neuromuscular re-education, joint mobilization and manual therapy  Plan details: Transferring to [facility]; Discharged from [facility]            Subjective Evaluation    History of Present Illness  Mechanism of injury: Patient reports he is currently 70-75% at this time  Patient said that he is able to move the arm without pain  However as he progresses through the range, he has more pain  Patient went to see MD on   Today is his last session at [facility]  He will be starting up at [facility] in 2 weeks  PT provided him with an updated HEP for this short hiatus from physical therapy  Quality of life: fair    Pain  Current pain ratin  At best pain ratin  At worst pain ratin  Location: R shoulder pain   Quality: needle-like, sharp and throbbing  Relieving factors: medications, change in position and ice  Aggravating factors: overhead activity and lifting  Progression: improved      Diagnostic Tests  X-ray: abnormal  MRI studies: abnormal  Treatments  No previous or current treatments  Current treatment: medication and physical therapy  Patient Goals  Patient goals for therapy: decreased pain, increased motion, increased strength, return to sport/leisure activities and independence with ADLs/IADLs  Patient goal: "I want to be pain free"         Objective     Active Range of Motion     Right Shoulder   Flexion: 90 degrees   Abduction: 100 degrees   External rotation BTH: Active external rotation behind the head: Mastoid Process     Internal rotation BTB: sacrum     Passive Range of Motion     Right Shoulder   Flexion: 125 degrees   Abduction: 125 degrees   External rotation 90°: 45 degrees   Internal rotation 45°: 45 degrees       Flowsheet Rows    Flowsheet Row Most Recent Value   PT/OT G-Codes   Current Score  62   Projected Score  70   FOTO information reviewed  Yes   Assessment Type  Re-evaluation   G code set  Carrying, Moving & Handling Objects   Carrying, Moving and Handling Objects Current Status ()  CJ   Carrying, Moving and Handling Objects Goal Status ()  Perla Logan

## 2018-03-16 NOTE — PROGRESS NOTES
Daily Note     Today's date: 3/16/2018  Patient name: Alem Marsh  : 1965  MRN: [id number]  Referring provider: Mirela Abdalla MD  Dx:   Encounter Diagnosis     ICD-10-CM    1  Encounter for aftercare Z51 89    2  Acute pain of right shoulder M25 511                   Subjective: Pt  Reports increased soreness in right shoulder a few hours after PT session yesterday  Pt  States he would like to "take it easy with stretching today "      Objective: See treatment diary below  Precautions: see Dr Echeverria Foots Repair Protocol      Daily Treatment Diary   Manual  2/22 2/26  2/28  3/5  3/9  3/15  3/16   R sh PROM  10 10'  AS  MP [address]  EW  EW   Inf/post glides   G1         [address]       Roller          [address]                                                 Exercise Diary       2/28  3/5  3/9  3/15  3/16                     Pulleys  5' 5'  5'  5'  5'   5'  5'   Finger ladder flex 10" x 10 10" x 10  10x10''  10"x10 10 x 10"   10" x 10  10" x 10   Finger ladder scaption 10" x 10 10" x 10  10x10''  10"x10 10 x 10"   10" x 10  10" x 10   Table slides flexion 10" x 10 10" x 10  10x10''  10"x10 10 x 10"   10" x 10  10" x 10   Table slides scaption 10" x 10 10" x 10  10x10''  10"x10 10 x 10"  10" x 10  10" x 10   Table slides ER 10" x 10 10" x 10  10x10''  10"x10 10 x 10" 10" x 10  10" x 10   Supine cane flexion 10" x 10 10" x 10  10x10''  10"x10  10 x 10"   10" x 10  10" x 10   Supine cane scaption 10" x 10 10" x 10  10x10''  10"x10  10 x 10"   10" x 10  10" x 10   Supine cane ER 10" x 10 10" x 10  10x10''  10"x10  10 x 10"   10" x 10  10" x 10   Shoulder Iso x 6 5" x 10 5" x 10  10x5''  10x5"  10 x 5"  10 x 5"  5" x 10         Modalities  2/22 2/26  2/28  3/5    3/15     CP PRN 5' 5'  10'  10'    10'     Laser                                         Assessment: Tolerated treatment fair  Patient would benefit from continued PT Pt   Will be transferring to [place] at this time secondary to change in jobs and location being closer to his work  Please see re-eval for further details  Plan: Continue per plan of care

## 2018-03-28 ENCOUNTER — EVALUATION (OUTPATIENT)
Dept: PHYSICAL THERAPY | Facility: CLINIC | Age: 53
End: 2018-03-28
Payer: COMMERCIAL

## 2018-03-28 DIAGNOSIS — M25.511 ACUTE PAIN OF RIGHT SHOULDER: ICD-10-CM

## 2018-03-28 DIAGNOSIS — Z51.89 ENCOUNTER FOR AFTERCARE: Primary | ICD-10-CM

## 2018-03-28 PROCEDURE — 97112 NEUROMUSCULAR REEDUCATION: CPT | Performed by: PHYSICAL THERAPIST

## 2018-03-28 PROCEDURE — 97110 THERAPEUTIC EXERCISES: CPT | Performed by: PHYSICAL THERAPIST

## 2018-03-28 PROCEDURE — 97140 MANUAL THERAPY 1/> REGIONS: CPT | Performed by: PHYSICAL THERAPIST

## 2018-03-28 PROCEDURE — 97164 PT RE-EVAL EST PLAN CARE: CPT | Performed by: PHYSICAL THERAPIST

## 2018-03-28 NOTE — PROGRESS NOTES
PT Re-Evaluation     Today's date: 3/28/2018  Patient name: Carroll Doe  : 1965  MRN: [id number]  Referring provider: Velia Castanon MD  Dx:   Encounter Diagnosis     ICD-10-CM    1  Encounter for aftercare Z51 89    2  Acute pain of right shoulder M25 511                   Assessment  Impairments: abnormal or restricted ROM, activity intolerance, impaired physical strength, lacks appropriate home exercise program and pain with function    Assessment details: He presents with improved elevation A/PROM and strength  He still does have deficits with abduction and ER ROM due to some capsular tightness and shoulder/scapular strength deficits  He is hoping to RTW in  and will be progressed according to protocol  Thank you again for the kind referral    Understanding of Dx/Px/POC: good   Prognosis: good    Goals  Short Term:  Pt will report decreased levels of pain by at least 2 subjective ratings in 4 weeks- partially met  Pt will demonstrate improved ROM by at least 10 degrees in 4 weeks- met  Pt will demonstrate improved strength by 1/2 grade- partially met  Long Term:   Pt will be independent in their HEP in 12 weeks  Pt will be be pain free with IADL's  Pt will demonstrate improved FOTO score   Patient's Goals:   "I want to be pain free"     Plan  Patient would benefit from: skilled PT  Planned modality interventions: cryotherapy and thermotherapy: hydrocollator packs  Planned therapy interventions: abdominal trunk stabilization, flexibility, functional ROM exercises, home exercise program, patient education, therapeutic exercise, therapeutic activities, stretching, strengthening, neuromuscular re-education, joint mobilization and manual therapy  Frequency: 2x week  Duration in weeks: 10        Subjective Evaluation    History of Present Illness  Mechanism of injury: Patient presents after transferring from the [facility] with s/p supraspinatus/infraspinatus repair and biceps tenodesis on 17  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  He plans to go back 100% in   He does have some overhead work  He still has some stiffness and difficulty     Quality of life: fair    Pain  Current pain ratin  At best pain ratin  At worst pain ratin  Location: R shoulder pain   Quality: needle-like, sharp and throbbing  Relieving factors: medications, change in position and ice  Aggravating factors: overhead activity and lifting  Progression: improved      Diagnostic Tests  X-ray: abnormal  MRI studies: abnormal  Treatments  No previous or current treatments  Current treatment: medication and physical therapy  Patient Goals  Patient goals for therapy: decreased pain, increased motion, increased strength, return to sport/leisure activities and independence with ADLs/IADLs  Patient goal: "I want to be pain free"         Objective     Active Range of Motion     Right Shoulder   Flexion: 105 degrees   Abduction: 97 degrees   External rotation 0°: 39 degrees   External rotation BTH: C4 (Mastoid Process)   Internal rotation BTB: sacrum     Additional Active Range of Motion Details  ER 4-/5 pain  Ir: 4/5 pain  Belly press: pain on L side  Observation: R upper trap compensation with elevation  scap retraction resisted: pain in R shoulder after 8 reps    SUGAR: posterior glide decreased pain with IR prom    Passive Range of Motion     Right Shoulder   Flexion: 135 degrees   Abduction: 107 degrees   External rotation 0°: 46 degrees   External rotation 45°: 55 degrees   External rotation 90°: 40 degrees   Internal rotation 45°: 26 degrees with pain    Precautions: see Dr Andrea Henry Cuff Repair Protocol      Daily Treatment Diary   Manual  3/28 2/26  2/28  3/5  3/9  3/15  3/16   R sh PROM  10' 10'  AS  [facility]  EW  EW   Inf/post glides   G1  5 min        [facility] [place]                                                 Exercise Diary   3/28    2/28  3/5  3/9  3/15  3/16                     Pulleys  5' 5'  5'  5'  5'   5'  5'   Finger ladder flex 10" x 10 10" x 10  10x10''  10"x10 10 x 10"   10" x 10  10" x 10   Finger ladder scaption 10" x 10 10" x 10  10x10''  10"x10 10 x 10"   10" x 10  10" x 10   wall slides flexion/abd- if jamel          AAROM flex wand          Wall ER ST          Supine cane flexion 10" x 10 10" x 10  10x10''  10"x10  10 x 10"   10" x 10  10" x 10   Sleeper ST 5x :10         Supine cane ER 10"x10                         Modalities  3/28 2/26  2/28  3/5    3/15     CP PRN np 5'  10'  10'    10'     Laser

## 2018-03-30 PROCEDURE — G8984 CARRY CURRENT STATUS: HCPCS | Performed by: PHYSICAL THERAPIST

## 2018-03-30 PROCEDURE — G8985 CARRY GOAL STATUS: HCPCS | Performed by: PHYSICAL THERAPIST

## 2018-04-02 ENCOUNTER — OFFICE VISIT (OUTPATIENT)
Dept: PHYSICAL THERAPY | Facility: CLINIC | Age: 53
End: 2018-04-02
Payer: COMMERCIAL

## 2018-04-02 DIAGNOSIS — M25.511 ACUTE PAIN OF RIGHT SHOULDER: Primary | ICD-10-CM

## 2018-04-02 DIAGNOSIS — Z51.89 ENCOUNTER FOR AFTERCARE: ICD-10-CM

## 2018-04-02 PROCEDURE — 97140 MANUAL THERAPY 1/> REGIONS: CPT | Performed by: PHYSICAL THERAPIST

## 2018-04-02 PROCEDURE — 97110 THERAPEUTIC EXERCISES: CPT | Performed by: PHYSICAL THERAPIST

## 2018-04-02 NOTE — PROGRESS NOTES
Daily Note     Today's date: 2018  Patient name: Brandi Mitchell  : 1965  MRN: [id number]  Referring provider: Jc Hinkle MD  Dx:   Encounter Diagnosis     ICD-10-CM    1  Encounter for aftercare Z51 89                   Subjective: He states he had muscle spasms yesterday for about 8 hours when he woke up for no specific reason  He has been performing HEP  Objective: See treatment diary below      Assessment: Tolerated treatment well  Patient demonstrated improved flex AROM to about 150 and abduction to about 120  ER still limited PROM to about 40  Added in wand ext and perform IR stretch nv  Plan: Continue per plan of care       Precautions: see Dr Tomas White Cuff Repair Protocol      Daily Treatment Diary   Manual  3/28 4/2  2/28  3/5  3/9  3/15  3/16   R sh PROM  10' 10'  AS  MP [address]  EW  EW   Inf/post glides   G1  5 min  5'     [address]       Roller     np     [address]                                                 Exercise Diary   3/28  4/2  2/28  3/5  3/9  3/15  3/16                     Pulleys  5' 5'  5'  5'  5'   5'  5'   Finger ladder flex 10" x 10 10" x 10  10x10''  10"x10 10 x 10"   10" x 10  10" x 10   Finger ladder scaption 10" x 10 10" x 10  10x10''  10"x10 10 x 10"   10" x 10  10" x 10   wall slides flexion/abd- if jamel  10x 10        AAROM flex wand          Wall ER ST  nv        Supine cane flexion 10" x 10 10" x 10  10x10''  10"x10  10 x 10"   10" x 10  10" x 10   Sleeper ST 5x :10 5x :10        Supine cane ER 10"x10 10"10        IR strap ST nv          Wand ext AAROM  10x :05                                                                Modalities  3/28 4/2  2/28  3/5    3/15     CP PRN np np  10'  10'    10'

## 2018-04-04 ENCOUNTER — OFFICE VISIT (OUTPATIENT)
Dept: PHYSICAL THERAPY | Facility: CLINIC | Age: 53
End: 2018-04-04
Payer: COMMERCIAL

## 2018-04-04 DIAGNOSIS — M25.511 ACUTE PAIN OF RIGHT SHOULDER: Primary | ICD-10-CM

## 2018-04-04 DIAGNOSIS — Z51.89 ENCOUNTER FOR AFTERCARE: ICD-10-CM

## 2018-04-04 PROCEDURE — 97140 MANUAL THERAPY 1/> REGIONS: CPT | Performed by: PHYSICAL THERAPIST

## 2018-04-04 PROCEDURE — 97110 THERAPEUTIC EXERCISES: CPT | Performed by: PHYSICAL THERAPIST

## 2018-04-04 PROCEDURE — 97112 NEUROMUSCULAR REEDUCATION: CPT | Performed by: PHYSICAL THERAPIST

## 2018-04-04 NOTE — PROGRESS NOTES
Daily Note     Today's date: 2018  Patient name: Lucian Ojeda  : 1965  MRN: [id number]  Referring provider: Peter Hyde MD  Dx:   Encounter Diagnosis     ICD-10-CM    1  Acute pain of right shoulder M25 511    2  Encounter for aftercare Z51 89                   Subjective: He states he had soreness Monday night and took pain medicine but the next day he felt better  Objective: See treatment diary below      Assessment: Tolerated treatment well  Patient demonstrated improved flex AROM to about 150 and abduction to about 120  ER still limited PROM to about 50      Plan: Continue per plan of care       Precautions: see Dr Frankey Ion Cuff Repair Protocol      Daily Treatment Diary   Manual  3/28 4/2 4/4  3/5  3/9  3/15  3/16   R sh PROM  10' 10'  10  MP [address]  EW  EW   Inf/post glides   G1  5 min  5'  5'   [address]       Roller     np     [address]                                                 Exercise Diary   3/28  4/2  4/4  3/5  3/9  3/15  3/16                     Pulleys  5' 5'  5'  5'  5'   5'  5'   Finger ladder flex 10" x 10 10" x 10  10x10''  10"x10 10 x 10"   10" x 10  10" x 10   Finger ladder scaption 10" x 10 10" x 10  10x10''  10"x10 10 x 10"   10" x 10  10" x 10   wall slides flexion/abd- if jamel  10x 10 10x :10       AAROM flex wand   10x       Wall ER ST  nv        Supine cane flexion 10" x 10 10" x 10  10x10''  10"x10  10 x 10"   10" x 10  10" x 10   Sleeper ST 5x :10 5x :10 10x :10       Supine cane ER 10"x10 10"10 10x :10       IR strap ST nv          Wand ext AAROM  10x :05 10x :05       Prone row  2x10 np       UE WB on scale   #50 x10       Reaching head                                    Modalities  3/28 4/2  4/4  3/5    3/15     CP PRN np np np  10'    10'

## 2018-04-09 ENCOUNTER — APPOINTMENT (OUTPATIENT)
Dept: PHYSICAL THERAPY | Facility: CLINIC | Age: 53
End: 2018-04-09
Payer: COMMERCIAL

## 2018-04-10 ENCOUNTER — OFFICE VISIT (OUTPATIENT)
Dept: OBGYN CLINIC | Facility: CLINIC | Age: 53
End: 2018-04-10
Payer: COMMERCIAL

## 2018-04-10 VITALS
HEIGHT: 65 IN | DIASTOLIC BLOOD PRESSURE: 96 MMHG | SYSTOLIC BLOOD PRESSURE: 149 MMHG | WEIGHT: 175 LBS | BODY MASS INDEX: 29.16 KG/M2 | HEART RATE: 76 BPM

## 2018-04-10 DIAGNOSIS — S46.011D TRAUMATIC COMPLETE TEAR OF RIGHT ROTATOR CUFF, SUBSEQUENT ENCOUNTER: Primary | ICD-10-CM

## 2018-04-10 DIAGNOSIS — S43.431D SUPERIOR GLENOID LABRUM LESION OF RIGHT SHOULDER, SUBSEQUENT ENCOUNTER: ICD-10-CM

## 2018-04-10 PROCEDURE — 99213 OFFICE O/P EST LOW 20 MIN: CPT | Performed by: ORTHOPAEDIC SURGERY

## 2018-04-10 NOTE — PROGRESS NOTES
Patient Name:  Carlos Liu  MRN:  [id number]    Assessment & Plan    Right shoulder arthroscopic rotator cuff repair and biceps tenodesis 12/18/17  1  Continue physical therapy per protocol  2  Follow-up in six weeks      Subjective    61-year-old male returns to the office today status post Right shoulder arthroscopic rotator cuff repair and biceps tenodesis 12/18/17  He notes overall improvements  He continues to participate in physical therapy  He still notes occasional discomfort on the anterior aspect of the shoulder  No significant weakness  No fevers or chills  No numbness or tingling  General ROS:  Negative for fever, lethargy/malaise, or night sweats  Objective    /96   Pulse 76   Ht 5' 4 5" (1 638 m)   Wt 79 4 kg (175 lb)   BMI 29 57 kg/m²     Right shoulder:  No gross deformity  No tenderness to palpation biceps insertion site  Passive range of motion includes 170° of forward flexion and 80° of external rotation-abduction  Negative Jacobs impingement  Negative empty can test   Negative speed's test   Sensation intact axillary, median, ulnar and radial nerves  2+ radial pulse          Scribe Attestation    I,:   Sharona Alejandra PA-C am acting as a scribe while in the presence of the attending physician :        I,:   Meenu Dominguez MD personally performed the services described in this documentation    as scribed in my presence :

## 2018-04-11 ENCOUNTER — OFFICE VISIT (OUTPATIENT)
Dept: PHYSICAL THERAPY | Facility: CLINIC | Age: 53
End: 2018-04-11
Payer: COMMERCIAL

## 2018-04-11 DIAGNOSIS — M25.511 ACUTE PAIN OF RIGHT SHOULDER: Primary | ICD-10-CM

## 2018-04-11 DIAGNOSIS — Z51.89 ENCOUNTER FOR AFTERCARE: ICD-10-CM

## 2018-04-11 PROCEDURE — 97110 THERAPEUTIC EXERCISES: CPT | Performed by: PHYSICAL THERAPIST

## 2018-04-11 PROCEDURE — 97112 NEUROMUSCULAR REEDUCATION: CPT | Performed by: PHYSICAL THERAPIST

## 2018-04-11 PROCEDURE — 97140 MANUAL THERAPY 1/> REGIONS: CPT | Performed by: PHYSICAL THERAPIST

## 2018-04-11 NOTE — PROGRESS NOTES
Daily Note     Today's date: 2018  Patient name: Marie Barger  : 1965  MRN: [id number]  Referring provider: Demarcus Jenkins MD  Dx:   Encounter Diagnosis     ICD-10-CM    1  Acute pain of right shoulder M25 511    2  Encounter for aftercare Z51 89                   Subjective: He states he had soreness Monday night and took pain medicine but the next day he felt better  Objective: See treatment diary below      Assessment: Tolerated treatment well  Patient demonstrated improved flex AROM to about 150 and abduction to about 120  ER still limited PROM but was able to improve with RTB exercises  Plan: Continue per plan of care       Precautions: see Dr Ross [place] Cuff Repair Protocol      Daily Treatment Diary   Manual  3/28 4/2 4/4  4/11  3/9  3/15  3/16   R sh PROM  10' 10'  10  10 [address]  EW  EW   Inf/post glides   G1  5 min  5'  5'  5' [address]       Roller     np     [address]                                                 Exercise Diary   3/28  4/2  4/4  4/11  3/9  3/15  3/16                     Pulleys  5' 5'  5'  5'  5'   5'  5'   Finger ladder flex 10" x 10 10" x 10  10x10''  10"x10 10 x 10"   10" x 10  10" x 10   Finger ladder scaption 10" x 10 10" x 10  10x10''  10"x10 10 x 10"   10" x 10  10" x 10   wall slides flexion/abd- if jamel  10x 10 10x :10       AROm flex #1   10x 2x10      Wall ER ST  nv  nv      Supine cane flexion 10" x 10 10" x 10  10x10''  10"x10  10 x 10"   10" x 10  10" x 10   Sleeper ST 5x :10 5x :10 10x :10 10x :10      Supine cane ER 10"x10 10"10 10x :10 10x :10      IR strap ST nv    10x :10      Wand ext AAROM  10x :05 10x :05 10x :05      Prone row  2x10 np 2x10      UE WB on scale   #50 x10       TB ER RTB    2x10      Prone Ts/Ys    2x10      S/L ER    2x15            Modalities  3/28 4/2  4/4  4/11    3/15     CP PRN np np np  10'    10'

## 2018-04-16 ENCOUNTER — OFFICE VISIT (OUTPATIENT)
Dept: PHYSICAL THERAPY | Facility: CLINIC | Age: 53
End: 2018-04-16
Payer: COMMERCIAL

## 2018-04-16 DIAGNOSIS — M25.511 ACUTE PAIN OF RIGHT SHOULDER: Primary | ICD-10-CM

## 2018-04-16 DIAGNOSIS — Z51.89 ENCOUNTER FOR AFTERCARE: ICD-10-CM

## 2018-04-16 PROCEDURE — 97140 MANUAL THERAPY 1/> REGIONS: CPT | Performed by: PHYSICAL THERAPIST

## 2018-04-16 PROCEDURE — 97110 THERAPEUTIC EXERCISES: CPT | Performed by: PHYSICAL THERAPIST

## 2018-04-18 ENCOUNTER — APPOINTMENT (OUTPATIENT)
Dept: PHYSICAL THERAPY | Facility: CLINIC | Age: 53
End: 2018-04-18
Payer: COMMERCIAL

## 2018-04-23 ENCOUNTER — OFFICE VISIT (OUTPATIENT)
Dept: PHYSICAL THERAPY | Facility: CLINIC | Age: 53
End: 2018-04-23
Payer: COMMERCIAL

## 2018-04-23 DIAGNOSIS — M25.511 ACUTE PAIN OF RIGHT SHOULDER: Primary | ICD-10-CM

## 2018-04-23 DIAGNOSIS — Z51.89 ENCOUNTER FOR AFTERCARE: ICD-10-CM

## 2018-04-23 PROCEDURE — 97112 NEUROMUSCULAR REEDUCATION: CPT | Performed by: PHYSICAL THERAPIST

## 2018-04-23 PROCEDURE — 97140 MANUAL THERAPY 1/> REGIONS: CPT | Performed by: PHYSICAL THERAPIST

## 2018-04-23 PROCEDURE — 97110 THERAPEUTIC EXERCISES: CPT | Performed by: PHYSICAL THERAPIST

## 2018-04-23 NOTE — PROGRESS NOTES
Daily Note     Today's date: 2018  Patient name: Rob Stout  : 1965  MRN: [id number]  Referring provider: Paul Babin MD  Dx:   Encounter Diagnosis     ICD-10-CM    1  Acute pain of right shoulder M25 511    2  Encounter for aftercare Z51 89                   Subjective: He states he is feeling well today, minor soreness contributed to increased activity but overall very happy with progression  Objective: See treatment diary below      Assessment: Able to enhance AROM flexion to 3# today without fatigue, only minor UT Comp at the end of reps  Plan: Continue per plan of care       Precautions: see Dr Omar Shepherd Cuff Repair Protocol      Daily Treatment Diary   Manual  3/28 4/2 4/4  4/11 4/16 4/23  3/16   R sh PROM  10' 10'  10  10 10' 10 min  EW   Inf/post glides   G1  5 min  5'  5'  5' 5'  5min     Roller     np                                                       Exercise Diary   3/28  4/2  4/4  4/11  4/16 4/23  3/16                     Pulleys  5' 5'  5'  5'  5'   5'  5'   Finger ladder flex 10" x 10 10" x 10  10x10''  10"x10 10 x 10"   10" x 10  10" x 10   Finger ladder scaption 10" x 10 10" x 10  10x10''  10"x10 10 x 10"   10" x 10  10" x 10   wall slides flexion/abd- if jamel  10x 10 10x :10  10x :10 10" x 10    AROm flex #1   10x 2x10 2x10 2 x 10 3 #    Wall ER ST  nv  nv      Supine cane flexion 10" x 10 10" x 10  10x10''  10"x10  10 x 10"   10" x 10  10" x 10   Sleeper ST 5x :10 5x :10 10x :10 10x :10 10x :10 10" x 10    Supine cane ER 10"x10 10"10 10x :10 10x :10 10x :10 10" x 10    IR strap ST nv    10x :10 10x :10 10" x 10    Wand ext AAROM  10x :05 10x :05 10x :05 10x :05 10" x 5    Prone row #3  2x10 np 2x10 2x10 2 x 10    UE WB on scale   #50 x10  #85 x10  85#x 10    TB ER RTB    2x10 2x10 2 x 10    Prone I's Ts/Ys    2x10 2x10 2 x 10    S/L ER    2x15 2x15 2 x 10          Modalities  3/28 4/2  4/4  4/11  4/23  3/15     CP PRN np np np  10'  10 min  10'

## 2018-04-25 ENCOUNTER — APPOINTMENT (OUTPATIENT)
Dept: PHYSICAL THERAPY | Facility: CLINIC | Age: 53
End: 2018-04-25
Payer: COMMERCIAL

## 2018-04-26 PROCEDURE — G8984 CARRY CURRENT STATUS: HCPCS | Performed by: PHYSICAL THERAPIST

## 2018-04-26 PROCEDURE — G8985 CARRY GOAL STATUS: HCPCS | Performed by: PHYSICAL THERAPIST

## 2018-04-30 ENCOUNTER — APPOINTMENT (OUTPATIENT)
Dept: PHYSICAL THERAPY | Facility: CLINIC | Age: 53
End: 2018-04-30
Payer: COMMERCIAL

## 2018-05-02 ENCOUNTER — EVALUATION (OUTPATIENT)
Dept: PHYSICAL THERAPY | Facility: CLINIC | Age: 53
End: 2018-05-02
Payer: COMMERCIAL

## 2018-05-02 DIAGNOSIS — Z51.89 ENCOUNTER FOR AFTERCARE: ICD-10-CM

## 2018-05-02 DIAGNOSIS — M25.511 ACUTE PAIN OF RIGHT SHOULDER: Primary | ICD-10-CM

## 2018-05-02 PROCEDURE — 97110 THERAPEUTIC EXERCISES: CPT | Performed by: PHYSICAL THERAPIST

## 2018-05-02 PROCEDURE — 97112 NEUROMUSCULAR REEDUCATION: CPT | Performed by: PHYSICAL THERAPIST

## 2018-05-02 PROCEDURE — G8985 CARRY GOAL STATUS: HCPCS | Performed by: PHYSICAL THERAPIST

## 2018-05-02 PROCEDURE — 97140 MANUAL THERAPY 1/> REGIONS: CPT | Performed by: PHYSICAL THERAPIST

## 2018-05-02 PROCEDURE — G8984 CARRY CURRENT STATUS: HCPCS | Performed by: PHYSICAL THERAPIST

## 2018-05-02 NOTE — PROGRESS NOTES
Daily Note     Today's date: 2018  Patient name: Joshua Meng  : 1965  MRN: [id number]  Referring provider: Darius Crawley MD  Dx:   Encounter Diagnosis     ICD-10-CM    1  Acute pain of right shoulder M25 511 PT plan of care cert/re-cert   2  Encounter for aftercare Z51 89                   Subjective: He states he is feeling well today, minor soreness contributed to increased activity but overall very happy with progression  Objective: See treatment diary below      Assessment: Able to enhance AROM flexion to 3# today without fatigue, only minor UT Comp at the end of reps  Plan: Continue per plan of care       Precautions: see Dr Noah Ordonez Cuff Repair Protocol      Daily Treatment Diary   Manual  3/28 4/2 4/4  4/11 4/16 4/23  5/2   R sh PROM  10' 10'  10  10 10' 10 min  10   post glides   GH gr3-4  5 min  5'  5'  5' 5'  5min  5                                                            Exercise Diary   3/28  4/2  4/4  4/11  4/16 4/23  5/2    Thoracic ext chair              10x :05   Pulleys  5' 5'  5'  5'  5'   5'  5'   Finger ladder flex 10" x 10 10" x 10  10x10''  10"x10 10 x 10"   10" x 10  10" x 10   Box lifts       nv   wall slides flexion/abd  10x 10 10x :10  10x :10 10" x 10 10x   AROm flex #3   10x 2x10 2x10 2 x 10 3 # 2x10   Wall ER ST overhead  nv  nv   nv   Body blade at 90       3x :30   Sleeper ST 5x :10 5x :10 10x :10 10x :10 10x :10 10" x 10 HEP   Golf swing       20x   IR strap ST     10x :10 10x :10 10" x 10 10"x10   Wand ext AAROM  10x :05 10x :05 10x :05 10x :05 10" x 5 10x   Prone row #3  2x10 np 2x10 2x10 2 x 10 2x10   Push up on table       nv   TB ER RTB    2x10 2x10 2 x 10 2x10   Prone I's Ts/Ys    2x10 2x10 2 x 10 2x10 #3   S/L ER    2x15 2x15 2 x 10          Modalities  3/28 4/2  4/4  4/11  4/23  3/15     CP PRN np np np  10'  10 min  10'

## 2018-05-02 NOTE — PROGRESS NOTES
PT Re-Evaluation     Today's date: 2018  Patient name: Nanci Park  : 1965  MRN: [id number]  Referring provider: Marylee Spence, MD  Dx:   Encounter Diagnosis     ICD-10-CM    1  Acute pain of right shoulder M25 511    2  Encounter for aftercare Z51 89                   Assessment  Impairments: abnormal or restricted ROM, activity intolerance, impaired physical strength, lacks appropriate home exercise program and pain with function    Assessment details: He presents with improved elevation A/PROM and strength and near full ROM  He still does have deficits with ER ROM due to some capsular tightness and overhead shoulder/scapular strength deficits  He has been reduced to 1x/week due to high co-pay  He would benefit from continued PT to maximize pain relief overhead ROM, strength for his move in July and return to full work status in    Thank you again for the kind referral    Understanding of Dx/Px/POC: good   Prognosis: good    Goals  Short Term:  Pt will report decreased levels of pain by at least 2 subjective ratings in 4 weeks- partially met  Pt will demonstrate improved ROM by at least 10 degrees in 4 weeks- met  Pt will demonstrate improved strength by 1/2 grade- met  Long Term:   Pt will be independent in their HEP in 12 weeks  Pt will be be pain free with IADL's- met  Pt will demonstrate improved FOTO score -met  Patient's Goals:   "I want to be pain free"     Plan  Patient would benefit from: skilled PT  Planned modality interventions: cryotherapy and thermotherapy: hydrocollator packs  Planned therapy interventions: abdominal trunk stabilization, flexibility, functional ROM exercises, home exercise program, patient education, therapeutic exercise, therapeutic activities, stretching, strengthening, neuromuscular re-education, joint mobilization and manual therapy  Frequency: 1x week  Duration in weeks: 6        Subjective Evaluation    History of Present Illness  Mechanism of injury: He states he does get some pain with rainy weather  He also has some soreness with overhead lifting but is improving quite a bit  He feels he can pretty much get back to work but feels he needs more strength    Quality of life: fair    Pain  Current pain ratin  At best pain ratin  At worst pain rating: 3  Location: R shoulder pain   Quality: sharp  Relieving factors: medications and change in position  Aggravating factors: overhead activity and lifting  Progression: improved    Treatments  No previous or current treatments  Current treatment: medication and physical therapy  Patient Goals  Patient goals for therapy: decreased pain, increased motion, increased strength, return to sport/leisure activities and independence with ADLs/IADLs  Patient goal: "I want to be pain free"         Objective     Active Range of Motion     Right Shoulder   Flexion: 154 degrees   Abduction: 142 degrees   External rotation 0°: 55 degrees with pain  External rotation BTH: T3   Internal rotation BTB: T7     Additional Active Range of Motion Details  ER 4/5   Ir: 4+/5   Flex: 4/5    SUGAR: posterior glide decreased pain with IR/ER prom    Passive Range of Motion     Right Shoulder   Flexion: 162 degrees   Abduction: 157 degrees with pain  External rotation 0°: 55 degrees   External rotation 90°: 70 degrees with pain  Internal rotation 45°: 52 degrees

## 2018-05-07 ENCOUNTER — APPOINTMENT (OUTPATIENT)
Dept: PHYSICAL THERAPY | Facility: CLINIC | Age: 53
End: 2018-05-07
Payer: COMMERCIAL

## 2018-05-09 ENCOUNTER — OFFICE VISIT (OUTPATIENT)
Dept: PHYSICAL THERAPY | Facility: CLINIC | Age: 53
End: 2018-05-09
Payer: COMMERCIAL

## 2018-05-09 DIAGNOSIS — M25.511 ACUTE PAIN OF RIGHT SHOULDER: Primary | ICD-10-CM

## 2018-05-09 DIAGNOSIS — Z51.89 ENCOUNTER FOR AFTERCARE: ICD-10-CM

## 2018-05-09 PROCEDURE — 97140 MANUAL THERAPY 1/> REGIONS: CPT | Performed by: PHYSICAL THERAPIST

## 2018-05-09 PROCEDURE — 97112 NEUROMUSCULAR REEDUCATION: CPT | Performed by: PHYSICAL THERAPIST

## 2018-05-09 PROCEDURE — 97110 THERAPEUTIC EXERCISES: CPT | Performed by: PHYSICAL THERAPIST

## 2018-05-09 NOTE — PROGRESS NOTES
Daily Note     Today's date: 2018  Patient name: Deepa Garnica  : 1965  MRN: [id number]  Referring provider: Andrea Cuba MD  Dx:   Encounter Diagnosis     ICD-10-CM    1  Acute pain of right shoulder M25 511    2  Encounter for aftercare Z51 89                   Subjective: He states he is feeling well and continuing program at home  Objective: See treatment diary below      Assessment: He needed cues for box lifts to improve form  End ROM of flex/abd limited and IR  Plan: Continue per plan of care       Precautions: see Dr Mathews [facility] Cuff Repair Protocol      Daily Treatment Diary   Manual     R sh PROM  10' 10'  10  10 10' 10 min  10   post glides   GH gr3-4  5 min  5'  5'  5' 5'  5min  5                                                            Exercise Diary      Thoracic ext chair              10x :05   Pulleys  5' 5'  5'  5'  5'   5'  5'   Finger ladder flex 10" x 10 10" x 10  10x10''  10"x10 10 x 10"   10" x 10  10" x 10   Box lifts 2x8      nv             AROm flex #4 2x10  10x 2x10 2x10 2 x 10 3 # 2x10   Wall ER ST overhead  nv  nv   nv   Body blade at 90 3x :30      3x :30   Sleeper ST  5x :10 10x :10 10x :10 10x :10 10" x 10 HEP   Golf swing       20x   IR strap ST     10x :10 10x :10 10" x 10 10"x10   Wand ext AAROM 10x 10x :05 10x :05 10x :05 10x :05 10" x 5 10x     2x10 np 2x10 2x10 2 x 10 2x10   Push up on table 2x10      nv   TB ER GTB 2x10   2x10 2x10 2 x 10 2x10   Prone I's Ts/Ys #4 2x10   2x10 2x10 2 x 10 2x10 #3   S/L ER    2x15 2x15 2 x 10          Modalities  3/28 4/2  4/4  4/11  4/23  3/15     CP PRN np np np  10'  10 min  10'

## 2018-05-14 ENCOUNTER — OFFICE VISIT (OUTPATIENT)
Dept: PHYSICAL THERAPY | Facility: CLINIC | Age: 53
End: 2018-05-14
Payer: COMMERCIAL

## 2018-05-14 DIAGNOSIS — M25.511 ACUTE PAIN OF RIGHT SHOULDER: Primary | ICD-10-CM

## 2018-05-14 DIAGNOSIS — Z51.89 ENCOUNTER FOR AFTERCARE: ICD-10-CM

## 2018-05-14 PROCEDURE — 97112 NEUROMUSCULAR REEDUCATION: CPT | Performed by: PHYSICAL THERAPIST

## 2018-05-14 PROCEDURE — 97140 MANUAL THERAPY 1/> REGIONS: CPT | Performed by: PHYSICAL THERAPIST

## 2018-05-14 PROCEDURE — 97110 THERAPEUTIC EXERCISES: CPT | Performed by: PHYSICAL THERAPIST

## 2018-05-14 NOTE — PROGRESS NOTES
Daily Note     Today's date: 2018  Patient name: Brea Randle  : 1965  MRN: [id number]  Referring provider: Julisa Baker MD  Dx:   Encounter Diagnosis     ICD-10-CM    1  Acute pain of right shoulder M25 511    2  Encounter for aftercare Z51 89                   Subjective: He states he is feeling pretty good today  Objective: See treatment diary below      Assessment: He tolerated overhead exercises well  End ROM of flex/abd limited and IR  Plan: Continue per plan of care    FOTO nv     Precautions: see Dr Belkis Lopes Cuff Repair Protocol      Daily Treatment Diary   Manual     R sh PROM  10' 10'  10  10 10' 10 min  10   post glides   GH gr3-4  5 min  5'  5'  5' 5'  5min  5                                                            Exercise Diary      Thoracic ext chair              10x :05   Pulleys  5' 3'  5'  5'  5'   5'  5'   Finger ladder flex 10" x 10 10" x 10  10x10''  10"x10 10 x 10"   10" x 10  10" x 10   Box lifts 2x8 2x8     nv             AROm flex #4 2x10 2x10 10x 2x10 2x10 2 x 10 3 # 2x10   Wall ER ST overhead  3x :20  nv   nv   Body blade at 90 3x :30 3x :30     3x :30   Sleeper ST  5x :10 10x :10 10x :10 10x :10 10" x 10 HEP   Golf swing       20x   IR strap ST     10x :10 10x :10 10" x 10 10"x10   Wand ext AAROM 10x 10x :05 10x :05 10x :05 10x :05 10" x 5 10x     2x10 np 2x10 2x10 2 x 10 2x10   Push up on table 2x10 2x10     nv   TB ER GTB 2x10 2x10  2x10 2x10 2 x 10 2x10   Prone I's Ts/Ys #4 2x10 #4 2x10  2x10 2x10 2 x 10 2x10 #3   S/L ER    2x15 2x15 2 x 10          Modalities  3/28 4/2  4/4  4/11  4/23  3/15     CP PRN np np np  10'  10 min  10'

## 2018-05-16 ENCOUNTER — APPOINTMENT (OUTPATIENT)
Dept: PHYSICAL THERAPY | Facility: CLINIC | Age: 53
End: 2018-05-16
Payer: COMMERCIAL

## 2018-05-21 ENCOUNTER — OFFICE VISIT (OUTPATIENT)
Dept: PHYSICAL THERAPY | Facility: CLINIC | Age: 53
End: 2018-05-21
Payer: COMMERCIAL

## 2018-05-21 DIAGNOSIS — M25.511 ACUTE PAIN OF RIGHT SHOULDER: Primary | ICD-10-CM

## 2018-05-21 DIAGNOSIS — Z51.89 ENCOUNTER FOR AFTERCARE: ICD-10-CM

## 2018-05-21 PROCEDURE — 97140 MANUAL THERAPY 1/> REGIONS: CPT | Performed by: PHYSICAL THERAPIST

## 2018-05-21 PROCEDURE — 97112 NEUROMUSCULAR REEDUCATION: CPT | Performed by: PHYSICAL THERAPIST

## 2018-05-21 PROCEDURE — 97110 THERAPEUTIC EXERCISES: CPT | Performed by: PHYSICAL THERAPIST

## 2018-05-21 NOTE — PROGRESS NOTES
Daily Note     Today's date: 2018  Patient name: Karyna Flood  : 1965  MRN: [id number]  Referring provider: Jennifer Genao MD  Dx:   Encounter Diagnosis     ICD-10-CM    1  Acute pain of right shoulder M25 511    2  Encounter for aftercare Z51 89                   Subjective: He states he is feeling pretty good today  Objective: See treatment diary below      Assessment: He tolerated overhead exercises well  End ROM of flex/abd limited and IR  Consider thoracic grade V mobilization if no significant change in ROM nv  Patient progressed in strength program        Plan: Continue per plan of care         Precautions: see Dr Mark Tripp Cuff Repair Protocol      Daily Treatment Diary   Manual     R sh PROM  10' 10'  10  10 10' 10 min  10   post glides   GH gr3-4  5 min  5'  5'  5' 5'  5min  5                                                            Exercise Diary      Thoracic ext chair              10x :05   Pulleys  5' 3'  3' abd  5'  5'   5'  5'   Finger ladder flex 10" x 10 10" x 10  10x10''  10"x10 10 x 10"   10" x 10  10" x 10   Box lifts #25 total 2x8 2x8 2x8     nv             AROm flex #5 2x10 2x10 10x 2x10 2x10 2 x 10 3 # 2x10   Wall ER ST overhead  3x :20 3x ;20 nv   nv   Body blade at 90 3x :30 3x :30 3x:30    3x :30   Sleeper ST  5x :10 10x :10 10x :10 10x :10 10" x 10 HEP   Golf swing       20x   IR strap ST    10x :10 10x :10 10x :10 10" x 10 10"x10   Wand ext AAROM 10x 10x :05 HEP 10x :05 10x :05 10" x 5 10x   Ball trampoline throw red   30x       Push up on table 2x10 2x10 2x10    nv   TB ER GTB 2x10 2x10 2x10 2x10 2x10 2 x 10 2x10   Prone I's Ts/Ys #4 2x10 #4 2x10 #5 2x10 2x10 2x10 2 x 10 2x10 #3   Sustained DB flexion hold   nv       S/L ER    2x15 2x15 2 x 10          Modalities  3/28 4/2  4/4  4/11  4/23  3/15     CP PRN np np np  10'  10 min  10'

## 2018-05-23 ENCOUNTER — APPOINTMENT (OUTPATIENT)
Dept: PHYSICAL THERAPY | Facility: CLINIC | Age: 53
End: 2018-05-23
Payer: COMMERCIAL

## 2018-05-29 ENCOUNTER — OFFICE VISIT (OUTPATIENT)
Dept: OBGYN CLINIC | Facility: CLINIC | Age: 53
End: 2018-05-29
Payer: COMMERCIAL

## 2018-05-29 VITALS
HEART RATE: 94 BPM | HEIGHT: 64 IN | SYSTOLIC BLOOD PRESSURE: 145 MMHG | DIASTOLIC BLOOD PRESSURE: 88 MMHG | WEIGHT: 176 LBS | BODY MASS INDEX: 30.05 KG/M2

## 2018-05-29 DIAGNOSIS — S43.431D SUPERIOR GLENOID LABRUM LESION OF RIGHT SHOULDER, SUBSEQUENT ENCOUNTER: ICD-10-CM

## 2018-05-29 DIAGNOSIS — S46.011D TRAUMATIC COMPLETE TEAR OF RIGHT ROTATOR CUFF, SUBSEQUENT ENCOUNTER: Primary | ICD-10-CM

## 2018-05-29 PROCEDURE — 99213 OFFICE O/P EST LOW 20 MIN: CPT | Performed by: ORTHOPAEDIC SURGERY

## 2018-05-29 NOTE — PROGRESS NOTES
Patient Name:  Belva Siemens  MRN:  [id number]    Assessment & Plan    Right shoulder arthroscopic rotator cuff repair and biceps tenodesis 12/18/17  1  Continue physical therapy and transition to home exercise program   2  Cleared to return to work full duty on 6/18/18  3  Follow-up in 5 weeks  Subjective    Patient returns today for his right shoulder  He reports continued improvement in physical therapy  He has some residual stiffness and intermittent brief episodes of mild pain  General ROS:  Negative for fever, lethargy/malaise, or night sweats  Neurological ROS:  Negative for confusion or numbness/tingling  Objective    /88   Pulse 94   Ht 5' 4" (1 626 m)   Wt 79 8 kg (176 lb)   BMI 30 21 kg/m²     Right shoulder:  Nontender to palpation  No gross deformity  Passive range of motion is forward flexion 150°/150°, external rotation-abduction 85°/95°, internal rotation-abduction 30°/50°, external rotation at the side 20°/30°, and internal rotation at the side L4/T12  Impingement signs are negative  Empty can test is negative  Speed's test is negative  5-/5 strength forward flexion  5/5 strength external rotation at the side  2+ radial pulse  Mood affect are appropriate

## 2018-05-29 NOTE — LETTER
May 29, 2018     Patient: Augusto Burnham   YOB: 1965   Date of Visit: 5/29/2018       To Whom it May Concern:    Veronika Medina is under my professional care  He was seen in my office on 5/29/2018  He is cleared to return to work full duty on 6/18/2018  If you have any questions or concerns, please don't hesitate to call           Sincerely,          Julia Quinonez MD

## 2018-05-30 ENCOUNTER — OFFICE VISIT (OUTPATIENT)
Dept: PHYSICAL THERAPY | Facility: CLINIC | Age: 53
End: 2018-05-30
Payer: COMMERCIAL

## 2018-05-30 DIAGNOSIS — M25.511 ACUTE PAIN OF RIGHT SHOULDER: ICD-10-CM

## 2018-05-30 DIAGNOSIS — Z51.89 ENCOUNTER FOR AFTERCARE: Primary | ICD-10-CM

## 2018-05-30 PROCEDURE — 97140 MANUAL THERAPY 1/> REGIONS: CPT | Performed by: PHYSICAL THERAPIST

## 2018-05-30 PROCEDURE — 97112 NEUROMUSCULAR REEDUCATION: CPT | Performed by: PHYSICAL THERAPIST

## 2018-05-30 PROCEDURE — 97110 THERAPEUTIC EXERCISES: CPT | Performed by: PHYSICAL THERAPIST

## 2018-05-30 NOTE — PROGRESS NOTES
Daily Note     Today's date: 2018  Patient name: Marcelle Dee  : 1965  MRN: [id number]  Referring provider: Gildardo Lauren MD  Dx:   Encounter Diagnosis     ICD-10-CM    1  Encounter for aftercare Z51 89    2  Acute pain of right shoulder M25 511                   Subjective: He states he is feeling pretty good today  Objective: See treatment diary below      Assessment: He tolerated overhead exercises well  Grade V mob improved IR ROM  program        Plan: Continue per plan of care  Next visit will probably be last visit      Precautions: see Dr Pappas Stacks Cuff Repair Protocol      Daily Treatment Diary   Manual     R sh PROM  10' 10'  10  10 10' 10 min  10   post glides   GH gr3-4  5 min  5'  5'  5' 5'  5min  5                                                            Exercise Diary      Thoracic ext chair              10x :05   Pulleys  5' 3'  3' abd  3'abd  5'   5'  5'             Box lifts #35 total 2x8 2x8 2x8  2x8   nv             AROm flex #5 2x10 2x10 10x 2x10 2x10 2 x 10 3 # 2x10   Wall ER ST overhead  3x :20 3x ;20 3x :20   nv   Body blade at 90 3x :30 3x :30 3x:30 3x:30   3x :30   Sleeper ST  5x :10 10x :10 10x :10 10x :10 10" x 10 HEP   Golf swing       20x   IR strap ST    10x :10 10x :10 10x :10 10" x 10 10"x10   Wand ext AAROM 10x 10x :05 HEP 10x :05 10x :05 10" x 5 10x   Ball trampoline throw red   30x 30x      Push up on table 2x10 2x10 2x10 2x10   nv   TB ER GTB 2x10 2x10 2x10 2x10 2x10 2 x 10 2x10   Prone I's Ts/Ys #4 2x10 #4 2x10 #5 2x10 2x10 2x10 2 x 10 2x10 #3   Sustained DB flexion hold                          Modalities  3/28 4/2  4/4  4/11  4/23  3/15     CP PRN np np np  10'  10 min  10'

## 2018-05-31 ENCOUNTER — APPOINTMENT (OUTPATIENT)
Dept: PHYSICAL THERAPY | Facility: CLINIC | Age: 53
End: 2018-05-31
Payer: COMMERCIAL

## 2018-06-04 ENCOUNTER — APPOINTMENT (OUTPATIENT)
Dept: PHYSICAL THERAPY | Facility: CLINIC | Age: 53
End: 2018-06-04
Payer: COMMERCIAL

## 2018-06-06 ENCOUNTER — OFFICE VISIT (OUTPATIENT)
Dept: PHYSICAL THERAPY | Facility: CLINIC | Age: 53
End: 2018-06-06
Payer: COMMERCIAL

## 2018-06-06 DIAGNOSIS — M25.511 ACUTE PAIN OF RIGHT SHOULDER: ICD-10-CM

## 2018-06-06 DIAGNOSIS — Z51.89 ENCOUNTER FOR AFTERCARE: Primary | ICD-10-CM

## 2018-06-06 PROCEDURE — G8986 CARRY D/C STATUS: HCPCS | Performed by: PHYSICAL THERAPIST

## 2018-06-06 PROCEDURE — 97110 THERAPEUTIC EXERCISES: CPT | Performed by: PHYSICAL THERAPIST

## 2018-06-06 PROCEDURE — 97112 NEUROMUSCULAR REEDUCATION: CPT | Performed by: PHYSICAL THERAPIST

## 2018-06-06 PROCEDURE — G8985 CARRY GOAL STATUS: HCPCS | Performed by: PHYSICAL THERAPIST

## 2018-06-06 PROCEDURE — 97140 MANUAL THERAPY 1/> REGIONS: CPT | Performed by: PHYSICAL THERAPIST

## 2018-06-06 NOTE — PROGRESS NOTES
Daily Note     Today's date: 2018  Patient name: Nanci Park  : 1965  MRN: [id number]  Referring provider: Marylee Spence, MD  Dx:   Encounter Diagnosis     ICD-10-CM    1  Encounter for aftercare Z51 89 PT plan of care cert/re-cert   2  Acute pain of right shoulder M25 511 PT plan of care cert/re-cert                  Subjective: He states he is feeling pretty good today  Objective: See treatment diary below      Assessment: He tolerated overhead exercises well  Grade V mob improved IR ROM  program        Plan: Continue per plan of care    Patient will be d/c to HEP    Precautions: see Dr Demetrius Beltran Cuff Repair Protocol      Daily Treatment Diary   Manual     R sh PROM  10' 10'  10  10 10' 10 min  10   post glides   GH gr3-4  5 min  5'  5'  5' 5'  5min  5                                                            Exercise Diary      Thoracic ext chair              10x :05   Pulleys  5' 3'  3' abd  3'abd  5'   5'  5'             Box lifts #35 total 2x8 2x8 2x8  2x8   nv             AROm flex #5 2x10 2x10 10x 2x10 2x10 2 x 10 3 # 2x10   Wall ER ST overhead  3x :20 3x ;20 3x :20   nv   Body blade at 90 3x :30 3x :30 3x:30 3x:30   3x :30   Sleeper ST  5x :10 10x :10 10x :10 10x :10 10" x 10 HEP   Golf swing       20x   IR strap ST    10x :10 10x :10 10x :10 10" x 10 10"x10   Wand ext AAROM 10x 10x :05 HEP 10x :05 10x :05 10" x 5 10x   Ball trampoline throw red   30x 30x      Push up on table 2x10 2x10 2x10 2x10   nv   TB ER GTB 2x10 2x10 2x10 2x10 2x10 2 x 10 2x10   Prone I's Ts/Ys #4 2x10 #4 2x10 #5 2x10 2x10 2x10 2 x 10 2x10 #3   Sustained DB flexion hold                          Modalities  3/28 4/2  4/4  4/11  4/23  3/15     CP PRN np np np  10'  10 min  10'

## 2018-06-06 NOTE — PROGRESS NOTES
PT Re-Evaluation     Today's date: 2018  Patient name: Leighton Wood  : 1965  MRN: [id number]  Referring provider: Ga Sharif MD  Dx:   Encounter Diagnosis     ICD-10-CM    1  Encounter for aftercare Z51 89    2  Acute pain of right shoulder M25 511                   Assessment  Impairments: abnormal or restricted ROM, activity intolerance, impaired physical strength, lacks appropriate home exercise program and pain with function    Assessment details: He presents with greatly improved strength and overhead ROM  He will be traveling to [place] soon and he is able to lift #25 overhead so he should not have any issues with carrying baggage and work overhead activities  He still does have some ER overhead weakness but was educated in progressive overhead exercises  He was educated to follow up as needed   Thank you again for the kind referral    Understanding of Dx/Px/POC: good   Prognosis: good    Goals  Short Term:  Pt will report decreased levels of pain by at least 2 subjective ratings in 4 weeks- partially met  Pt will demonstrate improved ROM by at least 10 degrees in 4 weeks- met  Pt will demonstrate improved strength by 1/2 grade- met  Long Term:   Pt will be independent in their HEP in 12 weeks  Pt will be be pain free with IADL's- met  Pt will demonstrate improved FOTO score - partially met  Patient's Goals:   "I want to be pain free"-met      Plan  Patient would benefit from: skilled PT  Planned modality interventions: cryotherapy and thermotherapy: hydrocollator packs  Planned therapy interventions: abdominal trunk stabilization, flexibility, functional ROM exercises, home exercise program, patient education, therapeutic exercise, therapeutic activities, stretching, strengthening, neuromuscular re-education, joint mobilization and manual therapy  Frequency: 1x week  Duration in weeks: 6        Subjective Evaluation    History of Present Illness  Mechanism of injury: He states he feels really good right now and does get occasional tugging sensation   He will be   Quality of life: fair    Pain  Current pain ratin  At best pain ratin  At worst pain ratin  Location: R shoulder pain   Quality: sharp  Relieving factors: medications and change in position  Aggravating factors: overhead activity  Progression: improved    Treatments  No previous or current treatments  Current treatment: medication and physical therapy  Patient Goals  Patient goals for therapy: decreased pain, increased motion, increased strength, return to sport/leisure activities and independence with ADLs/IADLs  Patient goal: "I want to be pain free"         Objective     Active Range of Motion     Right Shoulder   Flexion: 160 degrees   Abduction: 165 degrees   External rotation 0°: 64 degrees   External rotation BTH: T3   Internal rotation BTB: T7     Additional Active Range of Motion Details  ER 4+/5   Ir: 5/5   Flex: 4+/5    SUGAR: posterior glide decreased pain with IR/ER prom    Passive Range of Motion     Right Shoulder   Flexion: 165 degrees   Abduction: 170 degrees   External rotation 0°: 55 degrees   External rotation 90°: 90 degrees   Internal rotation 90°: 45 degrees

## 2018-07-06 ENCOUNTER — OFFICE VISIT (OUTPATIENT)
Dept: OBGYN CLINIC | Facility: CLINIC | Age: 53
End: 2018-07-06
Payer: COMMERCIAL

## 2018-07-06 VITALS
DIASTOLIC BLOOD PRESSURE: 77 MMHG | SYSTOLIC BLOOD PRESSURE: 155 MMHG | HEART RATE: 94 BPM | HEIGHT: 64 IN | WEIGHT: 176 LBS | BODY MASS INDEX: 30.05 KG/M2

## 2018-07-06 DIAGNOSIS — S43.431D SUPERIOR GLENOID LABRUM LESION OF RIGHT SHOULDER, SUBSEQUENT ENCOUNTER: ICD-10-CM

## 2018-07-06 DIAGNOSIS — S46.011D TRAUMATIC COMPLETE TEAR OF RIGHT ROTATOR CUFF, SUBSEQUENT ENCOUNTER: Primary | ICD-10-CM

## 2018-07-06 PROCEDURE — 99213 OFFICE O/P EST LOW 20 MIN: CPT | Performed by: ORTHOPAEDIC SURGERY

## 2018-07-06 NOTE — PROGRESS NOTES
Patient Name:  Monique Alexander  MRN:  [id number]    Assessment & Plan    Right shoulder arthroscopic rotator cuff repair and biceps tenodesis 12/18/17  1  Activity as tolerated, modify as needed  2  Continue full duty at work  3  Follow-up as needed  Subjective    Patient returns today for his right shoulder  He reports occasional spasms in the proximal biceps region  Overall the pain is minimal   He had some discomfort the cold weather while traveling to [address] [place] [address] recently  He has been tolerating full duty at work  General ROS:  Negative for fever, lethargy/malaise, or night sweats  Neurological ROS:  Negative for confusion or numbness/tingling  Objective    /77   Pulse 94   Ht 5' 4" (1 626 m)   Wt 79 8 kg (176 lb)   BMI 30 21 kg/m²     Right shoulder:  · No gross deformity  · Nontender to palpation  · Passive range of motion is full  · Neer impingement sign is negative  · Jacobs impingement sign is negative  · Empty can test is negative  · Speed's test is mildly positive  · Cross body adduction test is negative  · 5/5 strength forward flexion    Constitutional:  Well-developed and well-nourished  Eyes:  Anicteric sclerae  Lungs:  Unlabored breathing  Psychiatric:  Mood and affect are appropriate

## 2018-07-23 NOTE — PROGRESS NOTES
Ni Cantor  has made great functional progress with physical therapy  he was educated and updated in their home exercise program  Mikayla Crispin will be discharged from formal therapy due to independence in strengthening program but will follow up as needed

## 2018-11-19 ENCOUNTER — TELEPHONE (OUTPATIENT)
Dept: UROLOGY | Facility: AMBULATORY SURGERY CENTER | Age: 53
End: 2018-11-19

## 2018-11-19 NOTE — TELEPHONE ENCOUNTER
Reason for appointment/Complaint/Diagnosis : elevated psa 4 3  Scheduled patient for 12/27/18 for preference on Dr King Livers and Location  Insurance: [employer]    History of Cancer? no                 If yes, what kind? N/a  Previous urologist?    no                Records requested/where? Epic    Outside testing/where? [facility] and [facility]    Location Preference for office visit?  [facility]

## 2018-12-24 NOTE — PROGRESS NOTES
12/27/2018    Clemente Goldstein  1965  [id number]    Discussion and Plan    Absent any significant findings on prostate examination, I have advised repeating PSA in the next 6 weeks  If elevation persists, transrectal biopsies may be indicated  Risks including bleeding and infection discussed  All questions answered at this time  1  Elevated PSA  - PSA Total, Diagnostic; Future    Assessment      Patient Active Problem List   Diagnosis    Traumatic complete tear of right rotator cuff    Diabetes mellitus with no complication ([address] )    Hyperlipidemia    Superior glenoid labrum lesion of right shoulder    Elevated PSA       History of Present Illness    Judy Mar is a 48 y o  male seen today in regards to a history of PSA of 4 3 in October 2018  He has no prior records available for review  Patient notes fair urinary flow with complete bladder emptying sensation and nocturia times 1-2  Sporadic daytime urgency  No history of hematuria or urinary tract infection  Denies systemic illness  No known family history of prostate cancer      Urinary Symptom Assessment        Past Medical History  Past Medical History:   Diagnosis Date    CPAP (continuous positive airway pressure) dependence     Diabetes mellitus ([address] )     controlled with exercise and diet    Sleep apnea     uses CPAP at night       Past Social History  Past Surgical History:   Procedure Laterality Date    NO PAST SURGERIES      [place] ARTHROSCOPY SHOULDER SURGICAL BICEPS TENODESIS Right 12/18/2017    Procedure: ARTHROSCOPIC BICEPS TENODESIS;  Surgeon: Tommi Boas, MD;  [facility];  Service: Orthopedics    [place] SHLDR ARTHROSCOP,SURG,W/ROTAT CUFF REPR Right 12/18/2017    Procedure: RIGHT SHOULDER ARTHROSCOPIC ROTATOR CUFF REPAIR;  Surgeon: Tommi Boas, MD;  [facility];  Service: Orthopedics       Past Family History  Family History   Problem Relation Age of Onset    Hypertension Father     Cancer Father     Diabetes Maternal Grandmother     Heart disease Maternal Grandmother        Past Social history  Social History     Social History    Marital status: /Civil Union     Spouse name: N/A    Number of children: N/A    Years of education: N/A     Occupational History    Not on file  Social History Main Topics    Smoking status: Never Smoker    Smokeless tobacco: Never Used    Alcohol use Yes      Comment: occ    Drug use: No    Sexual activity: Not on file     Other Topics Concern    Not on file     Social History Narrative    No narrative on file       Current Medications  Current Outpatient Prescriptions   Medication Sig Dispense Refill    Cholecalciferol (VITAMIN D3) 1000 units CAPS Take by mouth      Glucosamine-Chondroitin (MOVE FREE PO) Take by mouth      glucose blood test strip Use as directed , tests FBS  daily      Lancets MISC Use as directed, tests daily      Multiple Vitamin (MULTIVITAMIN) capsule Take 1 capsule by mouth daily      naproxen (NAPROSYN) 500 mg tablet Take 1 tablet by mouth 2 (two) times a day with meals 30 tablet 0    Omega-3 1000 MG CAPS Take by mouth      oseltamivir (TAMIFLU) 75 mg capsule        No current facility-administered medications for this visit  Allergies  Allergies   Allergen Reactions    Cumin Oil Anaphylaxis     Has an EPI pen  Has an EPI pen       Past Medical History, Social History, Family History, medications and allergies were reviewed  Review of Systems  Review of Systems   Constitutional: Negative  HENT: Negative  Eyes: Negative  Respiratory: Negative  Cardiovascular: Negative  Gastrointestinal: Negative  Endocrine: Negative  Genitourinary: Positive for decreased urine volume  Negative for difficulty urinating, hematuria and urgency  Musculoskeletal: Negative  Skin: Negative  Neurological: Negative  Hematological: Negative  Psychiatric/Behavioral: Negative          Vitals  Vitals:    12/27/18 1445   BP: 140/92 BP Location: Left arm   Patient Position: Sitting   Cuff Size: Adult   Pulse: 76   Weight: 76 1 kg (167 lb 12 8 oz)   Height: 5' 5" (1 651 m)         Physical Exam    Physical Exam   Constitutional: He is oriented to person, place, and time  He appears well-developed and well-nourished  HENT:   Head: Normocephalic and atraumatic  Eyes: Pupils are equal, round, and reactive to light  Neck: Normal range of motion  Cardiovascular: Normal rate, regular rhythm and normal heart sounds  Pulmonary/Chest: Effort normal and breath sounds normal  No accessory muscle usage  No respiratory distress  Abdominal: Soft  Normal appearance and bowel sounds are normal  There is no tenderness  Genitourinary: Prostate normal and penis normal  No penile tenderness  Genitourinary Comments: Prostate 40 g with no distinct nodules  Musculoskeletal: Normal range of motion  Neurological: He is alert and oriented to person, place, and time  Skin: Skin is warm, dry and intact  Psychiatric: He has a normal mood and affect  His speech is normal  Cognition and memory are normal    Nursing note and vitals reviewed        Results    Below listed labs, pathology results, and radiology images were personally reviewed:    No results found for: PSA  Lab Results   Component Value Date    CALCIUM 9 3 12/08/2017    K 3 7 12/08/2017    CO2 29 12/08/2017     12/08/2017    BUN 12 12/08/2017    CREATININE 0 97 12/08/2017     No results found for: WBC, HGB, HCT, MCV, PLT    No results found for this or any previous visit (from the past 1 hour(s)) ]    -PSA

## 2018-12-27 ENCOUNTER — OFFICE VISIT (OUTPATIENT)
Dept: UROLOGY | Facility: CLINIC | Age: 53
End: 2018-12-27
Payer: COMMERCIAL

## 2018-12-27 VITALS
HEART RATE: 76 BPM | DIASTOLIC BLOOD PRESSURE: 92 MMHG | BODY MASS INDEX: 27.96 KG/M2 | SYSTOLIC BLOOD PRESSURE: 140 MMHG | WEIGHT: 167.8 LBS | HEIGHT: 65 IN

## 2018-12-27 DIAGNOSIS — R97.20 ELEVATED PSA: Primary | ICD-10-CM

## 2018-12-27 PROCEDURE — 99204 OFFICE O/P NEW MOD 45 MIN: CPT | Performed by: UROLOGY

## 2019-01-22 ENCOUNTER — TELEPHONE (OUTPATIENT)
Dept: UROLOGY | Facility: AMBULATORY SURGERY CENTER | Age: 54
End: 2019-01-22

## 2019-01-23 ENCOUNTER — APPOINTMENT (OUTPATIENT)
Dept: LAB | Facility: CLINIC | Age: 54
End: 2019-01-23
Payer: COMMERCIAL

## 2019-01-23 DIAGNOSIS — R97.20 ELEVATED PSA: ICD-10-CM

## 2019-01-23 LAB — PSA SERPL-MCNC: 3.6 NG/ML (ref 0–4)

## 2019-01-23 PROCEDURE — 84153 ASSAY OF PSA TOTAL: CPT

## 2019-02-26 NOTE — PROGRESS NOTES
2/28/2019    Shruti Goldstein  1965  [id number]    Discussion and Plan    Patient continues to do well overall with normalization of his PSA  Advised therefore continuing with yearly surveillance  We briefly discussed treatment for urinary symptoms if this occurs on a more consistent basis  All questions answered at this time  1  Elevated PSA  - PSA Total, Diagnostic; Future    Assessment      Patient Active Problem List   Diagnosis    Traumatic complete tear of right rotator cuff    Diabetes mellitus with no complication ([address] )    Hyperlipidemia    Superior glenoid labrum lesion of right shoulder    Elevated PSA       History of Present Illness    Demetrio Staton is a 48 y o  male seen today in regards to a history of PSA of 4 3 in October 2018  He has no prior records available for review  Patient notes fair urinary flow with complete bladder emptying sensation and nocturia times 1-2  Sporadic daytime urgency  No history of hematuria or urinary tract infection  Denies systemic illness  No known family history of prostate cancer  PSA has since normalized at 3 6  He does have sporadic changes in urination such as split stream or reduction in flow        Urinary Symptom Assessment        Past Medical History  Past Medical History:   Diagnosis Date    CPAP (continuous positive airway pressure) dependence     Diabetes mellitus ([address] )     controlled with exercise and diet    Sleep apnea     uses CPAP at night       Past Social History  Past Surgical History:   Procedure Laterality Date    NO PAST SURGERIES      [place] ARTHROSCOPY SHOULDER SURGICAL BICEPS TENODESIS Right 12/18/2017    Procedure: ARTHROSCOPIC BICEPS TENODESIS;  Surgeon: Kimi Valerio MD;  [facility];  Service: Orthopedics    [place] SHLDR ARTHROSCOP,SURG,W/ROTAT CUFF REPR Right 12/18/2017    Procedure: RIGHT SHOULDER ARTHROSCOPIC ROTATOR CUFF REPAIR;  Surgeon: Kimi Valerio MD;  [facility];  Service: Orthopedics       Past Family History  Family History   Problem Relation Age of Onset    Hypertension Father     Cancer Father     Diabetes Maternal Grandmother     Heart disease Maternal Grandmother        Past Social history  Social History     Socioeconomic History    Marital status: /Civil Union     Spouse name: Not on file    Number of children: Not on file    Years of education: Not on file    Highest education level: Not on file   Occupational History    Not on file   Social Needs    Financial resource strain: Not on file    Food insecurity:     Worry: Not on file     Inability: Not on file    Transportation needs:     Medical: Not on file     Non-medical: Not on file   Tobacco Use    Smoking status: Never Smoker    Smokeless tobacco: Never Used   Substance and Sexual Activity    Alcohol use: Yes     Comment: occ    Drug use: No    Sexual activity: Not on file   Lifestyle    Physical activity:     Days per week: Not on file     Minutes per session: Not on file    Stress: Not on file   Relationships    Social connections:     Talks on phone: Not on file     Gets together: Not on file     Attends [employer] service: Not on file     Active member of club or organization: Not on file     Attends meetings of clubs or organizations: Not on file     Relationship status: Not on file    Intimate partner violence:     Fear of current or ex partner: Not on file     Emotionally abused: Not on file     Physically abused: Not on file     Forced sexual activity: Not on file   Other Topics Concern    Not on file   Social History Narrative    Not on file       Current Medications  Current Outpatient Medications   Medication Sig Dispense Refill    Cholecalciferol (VITAMIN D3) 1000 units CAPS Take by mouth      Glucosamine-Chondroitin (MOVE FREE PO) Take by mouth      glucose blood test strip Use as directed , tests FBS  daily      Lancets MISC Use as directed, tests daily      Multiple Vitamin (MULTIVITAMIN) capsule Take 1 capsule by mouth daily      naproxen (NAPROSYN) 500 mg tablet Take 1 tablet by mouth 2 (two) times a day with meals 30 tablet 0    Omega-3 1000 MG CAPS Take by mouth      oseltamivir (TAMIFLU) 75 mg capsule       oxyCODONE-acetaminophen (PERCOCET) 5-325 mg per tablet TAKE 1 TO 2 TABLETS BY MOUTH EVERY 6 HOURS AS NEEDED FOR PAIN  MAX DAILY AMOUNT: 8 TABLETS  0     No current facility-administered medications for this visit  Allergies  Allergies   Allergen Reactions    Cumin Oil Anaphylaxis     Has an EPI pen  Has an EPI pen       Past Medical History, Social History, Family History, medications and allergies were reviewed  Review of Systems  Review of Systems   Constitutional: Negative  HENT: Negative  Eyes: Negative  Respiratory: Negative  Cardiovascular: Negative  Gastrointestinal: Negative  Endocrine: Negative  Musculoskeletal: Negative  Skin: Negative  Neurological: Negative  Hematological: Negative  Psychiatric/Behavioral: Negative  Vitals  Vitals:    02/28/19 1002   BP: 122/78   BP Location: Left arm   Patient Position: Sitting   Cuff Size: Adult   Pulse: 76   Weight: 77 1 kg (170 lb)   Height: 5' 5" (1 651 m)         Physical Exam    Physical Exam   Constitutional: He is oriented to person, place, and time  He appears well-developed and well-nourished  HENT:   Head: Normocephalic and atraumatic  Eyes: Pupils are equal, round, and reactive to light  Neck: Normal range of motion  Cardiovascular: Normal rate, regular rhythm and normal heart sounds  Pulmonary/Chest: Effort normal and breath sounds normal  No accessory muscle usage  No respiratory distress  Abdominal: Soft  Normal appearance and bowel sounds are normal  There is no tenderness  Musculoskeletal: Normal range of motion  Neurological: He is alert and oriented to person, place, and time  Skin: Skin is warm, dry and intact  Psychiatric: He has a normal mood and affect   His speech is normal  Cognition and memory are normal    Nursing note and vitals reviewed        Results    Below listed labs, pathology results, and radiology images were personally reviewed:    Lab Results   Component Value Date/Time    PSA 3 6 01/23/2019 08:16 AM     Lab Results   Component Value Date    CALCIUM 9 3 12/08/2017    K 3 7 12/08/2017    CO2 29 12/08/2017     12/08/2017    BUN 12 12/08/2017    CREATININE 0 97 12/08/2017     No results found for: WBC, HGB, HCT, MCV, PLT    No results found for this or any previous visit (from the past 1 hour(s)) ]

## 2019-02-28 ENCOUNTER — OFFICE VISIT (OUTPATIENT)
Dept: UROLOGY | Facility: CLINIC | Age: 54
End: 2019-02-28
Payer: COMMERCIAL

## 2019-02-28 VITALS
SYSTOLIC BLOOD PRESSURE: 122 MMHG | HEIGHT: 65 IN | BODY MASS INDEX: 28.32 KG/M2 | HEART RATE: 76 BPM | WEIGHT: 170 LBS | DIASTOLIC BLOOD PRESSURE: 78 MMHG

## 2019-02-28 DIAGNOSIS — R97.20 ELEVATED PSA: Primary | ICD-10-CM

## 2019-02-28 PROCEDURE — 99213 OFFICE O/P EST LOW 20 MIN: CPT | Performed by: UROLOGY

## 2019-02-28 RX ORDER — OXYCODONE HYDROCHLORIDE AND ACETAMINOPHEN 5; 325 MG/1; MG/1
TABLET ORAL
Refills: 0 | COMMUNITY
Start: 2018-12-28 | End: 2019-05-08

## 2019-04-02 ENCOUNTER — APPOINTMENT (OUTPATIENT)
Dept: RADIOLOGY | Facility: CLINIC | Age: 54
End: 2019-04-02
Payer: COMMERCIAL

## 2019-04-02 ENCOUNTER — OFFICE VISIT (OUTPATIENT)
Dept: OBGYN CLINIC | Facility: CLINIC | Age: 54
End: 2019-04-02
Payer: COMMERCIAL

## 2019-04-02 VITALS
BODY MASS INDEX: 28.34 KG/M2 | SYSTOLIC BLOOD PRESSURE: 154 MMHG | WEIGHT: 166 LBS | HEIGHT: 64 IN | DIASTOLIC BLOOD PRESSURE: 103 MMHG | HEART RATE: 73 BPM

## 2019-04-02 DIAGNOSIS — S46.012A STRAIN OF LEFT ROTATOR CUFF CAPSULE, INITIAL ENCOUNTER: Primary | ICD-10-CM

## 2019-04-02 DIAGNOSIS — M25.512 LEFT SHOULDER PAIN, UNSPECIFIED CHRONICITY: ICD-10-CM

## 2019-04-02 PROCEDURE — 73030 X-RAY EXAM OF SHOULDER: CPT

## 2019-04-02 PROCEDURE — 99214 OFFICE O/P EST MOD 30 MIN: CPT | Performed by: ORTHOPAEDIC SURGERY

## 2019-04-10 ENCOUNTER — APPOINTMENT (OUTPATIENT)
Dept: PHYSICAL THERAPY | Facility: CLINIC | Age: 54
End: 2019-04-10
Payer: COMMERCIAL

## 2019-04-16 ENCOUNTER — EVALUATION (OUTPATIENT)
Dept: PHYSICAL THERAPY | Facility: CLINIC | Age: 54
End: 2019-04-16
Payer: COMMERCIAL

## 2019-04-16 DIAGNOSIS — S46.012A STRAIN OF LEFT ROTATOR CUFF CAPSULE, INITIAL ENCOUNTER: Primary | ICD-10-CM

## 2019-04-16 PROCEDURE — 97162 PT EVAL MOD COMPLEX 30 MIN: CPT | Performed by: PHYSICAL THERAPIST

## 2019-04-23 ENCOUNTER — OFFICE VISIT (OUTPATIENT)
Dept: PHYSICAL THERAPY | Facility: CLINIC | Age: 54
End: 2019-04-23
Payer: COMMERCIAL

## 2019-04-23 DIAGNOSIS — S46.012A STRAIN OF LEFT ROTATOR CUFF CAPSULE, INITIAL ENCOUNTER: Primary | ICD-10-CM

## 2019-04-23 PROCEDURE — 97110 THERAPEUTIC EXERCISES: CPT

## 2019-04-23 PROCEDURE — 97140 MANUAL THERAPY 1/> REGIONS: CPT

## 2019-04-23 PROCEDURE — 97112 NEUROMUSCULAR REEDUCATION: CPT

## 2019-04-30 ENCOUNTER — OFFICE VISIT (OUTPATIENT)
Dept: PHYSICAL THERAPY | Facility: CLINIC | Age: 54
End: 2019-04-30
Payer: COMMERCIAL

## 2019-04-30 DIAGNOSIS — S46.012A STRAIN OF LEFT ROTATOR CUFF CAPSULE, INITIAL ENCOUNTER: Primary | ICD-10-CM

## 2019-04-30 PROCEDURE — 97110 THERAPEUTIC EXERCISES: CPT

## 2019-04-30 PROCEDURE — 97112 NEUROMUSCULAR REEDUCATION: CPT

## 2019-04-30 PROCEDURE — 97140 MANUAL THERAPY 1/> REGIONS: CPT

## 2019-05-06 ENCOUNTER — OFFICE VISIT (OUTPATIENT)
Dept: PHYSICAL THERAPY | Facility: CLINIC | Age: 54
End: 2019-05-06
Payer: COMMERCIAL

## 2019-05-06 DIAGNOSIS — S46.012A STRAIN OF LEFT ROTATOR CUFF CAPSULE, INITIAL ENCOUNTER: Primary | ICD-10-CM

## 2019-05-06 PROCEDURE — 97140 MANUAL THERAPY 1/> REGIONS: CPT | Performed by: PHYSICAL THERAPIST

## 2019-05-06 PROCEDURE — 97112 NEUROMUSCULAR REEDUCATION: CPT | Performed by: PHYSICAL THERAPIST

## 2019-05-06 PROCEDURE — 97110 THERAPEUTIC EXERCISES: CPT | Performed by: PHYSICAL THERAPIST

## 2019-05-08 ENCOUNTER — OFFICE VISIT (OUTPATIENT)
Dept: OBGYN CLINIC | Facility: CLINIC | Age: 54
End: 2019-05-08
Payer: COMMERCIAL

## 2019-05-08 VITALS
WEIGHT: 166 LBS | SYSTOLIC BLOOD PRESSURE: 153 MMHG | HEIGHT: 64 IN | HEART RATE: 106 BPM | DIASTOLIC BLOOD PRESSURE: 93 MMHG | BODY MASS INDEX: 28.34 KG/M2

## 2019-05-08 DIAGNOSIS — S46.012A STRAIN OF LEFT ROTATOR CUFF CAPSULE, INITIAL ENCOUNTER: ICD-10-CM

## 2019-05-08 DIAGNOSIS — M25.512 LEFT SHOULDER PAIN, UNSPECIFIED CHRONICITY: Primary | ICD-10-CM

## 2019-05-08 PROCEDURE — 99213 OFFICE O/P EST LOW 20 MIN: CPT | Performed by: ORTHOPAEDIC SURGERY

## 2019-05-09 ENCOUNTER — TRANSCRIBE ORDERS (OUTPATIENT)
Dept: ADMINISTRATIVE | Facility: HOSPITAL | Age: 54
End: 2019-05-09

## 2019-05-09 DIAGNOSIS — S46.012S STRAIN OF TENDON OF LEFT ROTATOR CUFF, SEQUELA: Primary | ICD-10-CM

## 2019-05-09 DIAGNOSIS — M25.512 LEFT SHOULDER PAIN, UNSPECIFIED CHRONICITY: ICD-10-CM

## 2019-06-27 ENCOUNTER — TELEPHONE (OUTPATIENT)
Dept: OBGYN CLINIC | Facility: HOSPITAL | Age: 54
End: 2019-06-27

## 2019-07-12 ENCOUNTER — OFFICE VISIT (OUTPATIENT)
Dept: OBGYN CLINIC | Facility: CLINIC | Age: 54
End: 2019-07-12
Payer: COMMERCIAL

## 2019-07-12 VITALS
HEART RATE: 76 BPM | DIASTOLIC BLOOD PRESSURE: 100 MMHG | WEIGHT: 164 LBS | HEIGHT: 64 IN | BODY MASS INDEX: 28 KG/M2 | SYSTOLIC BLOOD PRESSURE: 146 MMHG

## 2019-07-12 DIAGNOSIS — S46.812A TRAUMATIC TEAR OF SUPRASPINATUS TENDON OF LEFT SHOULDER, INITIAL ENCOUNTER: Primary | ICD-10-CM

## 2019-07-12 PROCEDURE — 99213 OFFICE O/P EST LOW 20 MIN: CPT | Performed by: ORTHOPAEDIC SURGERY

## 2019-07-12 NOTE — PROGRESS NOTES
Patient Name:  Arcelia Syed  MRN:  [id number]    Assessment & Plan     Left Shoulder, near full thickness tear of the supraspinatus tendon sustained in 3/2019    1  Explained to the patient that the tear of his rotator cuff is amenable to an arthroscopic procedure to repair but this is ultimately a quality of life decision for the patient to perform  At this time, the patient needs to wait on having a procedure performed due to upcoming projects and other job duties  2  May perform activities as tolerated  Avoid painful and repetitive motions  May continue home exercise program as tolerated  3  He will follow up when he is ready to schedule an arthroscopic rotator cuff repair  Instructed to follow up about 2-4 weeks before anticipated surgical date  Subjective     53-year-old male presents to the office today for follow-up visit for his left shoulder as well as to discuss MRI arthrogram results  Patient sustained skiing injury in March of 2019  Patient has been treating in formal physical therapy and HEP for rotator cuff strain with only mild relief  Patient continues to note intermittent anterior and posterior left shoulder pain which is worse in the morning as well as with overhead and repetitive motions  Patient's history of a right shoulder rotator cuff repair done by myself on 12/18/2017  General ROS:  Negative for fever or chills  Neurological ROS:  Negative for numbness or tingling  Objective     /100   Pulse 76   Ht 5' 4" (1 626 m)   Wt 74 4 kg (164 lb)   BMI 28 15 kg/m²       Counseling     The patient was counseled regarding diagnostic results, impressions, patient/family education, instructions for management, risks and benefits of treatment options, and prognosis  The total time of the encounter was 20 minutes, and more than 50% of that time was spent in counseling and coordination of care            Data Review     I have personally reviewed pertinent films in PACS, and my interpretation follows  MRI arthrogram left shoulder, report only available:  Significant irregular retracted tear of the supraspinatus tendon  Anterior posterior appear to be mostly intact  There is no labral tear  Mild to moderate hypertrophic change and inflammation of the AC joint        Social History     Tobacco Use    Smoking status: Never Smoker    Smokeless tobacco: Never Used   Substance Use Topics    Alcohol use: Yes     Comment: occ    Drug use: No       Scribe Attestation    I,:   Crys Torres am acting as a scribe while in the presence of the attending physician :        I,:   Federico Ruiz MD personally performed the services described in this documentation    as scribed in my presence :

## 2019-11-01 ENCOUNTER — TELEPHONE (OUTPATIENT)
Dept: DERMATOLOGY | Facility: CLINIC | Age: 54
End: 2019-11-01

## 2019-11-19 ENCOUNTER — APPOINTMENT (OUTPATIENT)
Dept: LAB | Facility: AMBULARY SURGERY CENTER | Age: 54
End: 2019-11-19
Attending: ORTHOPAEDIC SURGERY
Payer: COMMERCIAL

## 2019-11-19 ENCOUNTER — OFFICE VISIT (OUTPATIENT)
Dept: OBGYN CLINIC | Facility: CLINIC | Age: 54
End: 2019-11-19
Payer: COMMERCIAL

## 2019-11-19 VITALS
BODY MASS INDEX: 29.02 KG/M2 | WEIGHT: 170 LBS | HEART RATE: 76 BPM | HEIGHT: 64 IN | DIASTOLIC BLOOD PRESSURE: 94 MMHG | SYSTOLIC BLOOD PRESSURE: 155 MMHG

## 2019-11-19 DIAGNOSIS — S46.812D TRAUMATIC TEAR OF SUPRASPINATUS TENDON OF LEFT SHOULDER, SUBSEQUENT ENCOUNTER: Primary | ICD-10-CM

## 2019-11-19 DIAGNOSIS — S46.812D TRAUMATIC TEAR OF SUPRASPINATUS TENDON OF LEFT SHOULDER, SUBSEQUENT ENCOUNTER: ICD-10-CM

## 2019-11-19 PROBLEM — S46.812A TRAUMATIC TEAR OF SUPRASPINATUS TENDON OF LEFT SHOULDER: Status: ACTIVE | Noted: 2019-11-19

## 2019-11-19 LAB
ANION GAP SERPL CALCULATED.3IONS-SCNC: 8 MMOL/L (ref 4–13)
BUN SERPL-MCNC: 13 MG/DL (ref 5–25)
CALCIUM SERPL-MCNC: 9.7 MG/DL (ref 8.3–10.1)
CHLORIDE SERPL-SCNC: 108 MMOL/L (ref 100–108)
CO2 SERPL-SCNC: 27 MMOL/L (ref 21–32)
CREAT SERPL-MCNC: 0.9 MG/DL (ref 0.6–1.3)
GFR SERPL CREATININE-BSD FRML MDRD: 98 ML/MIN/1.73SQ M
GLUCOSE SERPL-MCNC: 157 MG/DL (ref 65–140)
POTASSIUM SERPL-SCNC: 4.1 MMOL/L (ref 3.5–5.3)
SODIUM SERPL-SCNC: 143 MMOL/L (ref 136–145)

## 2019-11-19 PROCEDURE — 36415 COLL VENOUS BLD VENIPUNCTURE: CPT

## 2019-11-19 PROCEDURE — 80048 BASIC METABOLIC PNL TOTAL CA: CPT

## 2019-11-19 PROCEDURE — 99214 OFFICE O/P EST MOD 30 MIN: CPT | Performed by: ORTHOPAEDIC SURGERY

## 2019-11-19 RX ORDER — CHLORHEXIDINE GLUCONATE 4 G/100ML
SOLUTION TOPICAL DAILY PRN
Status: CANCELLED | OUTPATIENT
Start: 2019-11-19

## 2019-11-19 NOTE — PROGRESS NOTES
Assessment:   Diagnosis ICD-10-CM Associated Orders   1  Traumatic tear of supraspinatus tendon of left shoulder, subsequent encounter S46 812D Ambulatory referral to Physical Therapy     Case request operating room: ARTHROSCOPIC ROTATOR CUFF  REPAIR     Basic metabolic panel     Case request operating room: ARTHROSCOPIC ROTATOR CUFF  REPAIR       Plan:    Patient would like to proceed with left shoulder arthroscopic rotator cuff repair, his tear is amenable to repair  MRI arthrogram again shows near full thickness tear of supraspinatus sustained 3/2019  He is noting increase in pain and weakness and would like to have surgery to avoid and further deficits  Risks reviewed with patient dvt, stroke, infection, nerve damage, post op pain, bleeding, stiffness  Consent signed, therapy ordered  See post op   To do next visit:  Return for Post op  The above stated was discussed in layman's terms and the patient expressed understanding  All questions were answered to the patient's satisfaction  Scribe Attestation    I,:   Karma Painter am acting as a scribe while in the presence of the attending physician :        I,:   Darrel Stevenson MD personally performed the services described in this documentation    as scribed in my presence :              Subjective:   Jeremy Figueredo is a 48 y o  male who presents for f/u regarding his left shoulder  He has been treating conservatively for near full thickness tear of supraspinatus since 3/2019  Skiing injury in March  He has done formal physical therapy and maintained HEP with minimal relief of symptoms  Pain continues anterior-posterior worse with overhead motions, repetitive motions  Worst pain in morning  He is noting more weakness in shoulder lately, fatiguing quickly  History RTC repair 12/18/17 done by Dr Cayetano Reza         Review of systems negative unless otherwise specified in HPI    Past Medical History:   Diagnosis Date    CPAP (continuous positive airway pressure) dependence     [address] mellitus [address] [address]     controlled with exercise and diet    Sleep apnea     uses CPAP at night       Past Surgical History:   Procedure Laterality Date    NO PAST SURGERIES      [place] ARTHROSCOPY SHOULDER SURGICAL BICEPS TENODESIS Right 12/18/2017    Procedure: ARTHROSCOPIC BICEPS TENODESIS;  Surgeon: Jadiel Elisa MD;  [facility];  Service: Orthopedics    [place] SHLDR ARTHROSCOP,SURG,W/ROTAT CUFF REPR Right 12/18/2017    Procedure: RIGHT SHOULDER ARTHROSCOPIC ROTATOR CUFF REPAIR;  Surgeon: Jadiel Elias MD;  [facility];  Service: Orthopedics       Family History   Problem Relation Age of Onset    Hypertension Father     Cancer Father     Diabetes Maternal Grandmother     Heart disease Maternal Grandmother        Social History     Occupational History    Not on file   Tobacco Use    Smoking status: Never Smoker    Smokeless tobacco: Never Used   Substance and Sexual Activity    Alcohol use: Yes     Comment: occ    Drug use: No    Sexual activity: Not on file         Current Outpatient Medications:     Cholecalciferol (VITAMIN D3) 1000 units CAPS, Take by mouth, Disp: , Rfl:     Glucosamine-Chondroitin (MOVE FREE PO), Take by mouth, Disp: , Rfl:     glucose blood test strip, Use as directed , tests FBS  daily, Disp: , Rfl:     Lancets MISC, Use as directed, tests daily, Disp: , Rfl:     Multiple Vitamin (MULTIVITAMIN) capsule, Take 1 capsule by mouth daily, Disp: , Rfl:     Omega-3 1000 MG CAPS, Take by mouth, Disp: , Rfl:     Allergies   Allergen Reactions    Cumin Oil Anaphylaxis     Has an EPI pen  Has an EPI pen            Vitals:    11/19/19 0826   BP: 155/94   Pulse: 76       Objective:  Constitutional: Well-developed and well-nourished  Eyes: Anicteric sclerae  Lungs: Unlabored breathing  Cardiovascular: Capillary refill is less than 2 seconds  Skin: Intact without erythema  Neurologic: Sensation intact to light touch    Psychiatric: Mood and affect are appropriate  Left Shoulder Exam     Tenderness   Left shoulder tenderness location: anterior-posterior glenohumeral tenderness  Range of Motion   Active abduction: 170   Passive abduction: 170   External rotation: 90   Forward flexion: 180     Muscle Strength   Abduction: 5/5   Internal rotation: 5/5   External rotation: 4/5     Tests   Jacobs test: positive  Cross arm: negative    Other   Erythema: absent  Scars: absent  Sensation: normal  Pulse: present     Comments:  Positive speeds   Positive empty can test               Diagnostics, reviewed and taken today if performed as documented:    None performed            Procedures, if performed today:    Procedures    None performed      Portions of the record may have been created with voice recognition software  Occasional wrong word or "sound a like" substitutions may have occurred due to the inherent limitations of voice recognition software  Read the chart carefully and recognize, using context, where substitutions have occurred

## 2019-12-10 NOTE — PRE-PROCEDURE INSTRUCTIONS
Pre-Surgery Instructions:   Medication Instructions    Cholecalciferol (VITAMIN D3) 1000 units CAPS Instructed patient per Anesthesia Guidelines   Glucosamine-Chondroitin (MOVE FREE PO) Instructed patient per Anesthesia Guidelines   glucose blood test strip Instructed patient per Anesthesia Guidelines   Lancets MISC Instructed patient per Anesthesia Guidelines   Multiple Vitamin (MULTIVITAMIN) capsule Instructed patient per Anesthesia Guidelines   Omega-3 1000 MG CAPS Instructed patient per Anesthesia Guidelines      Pre op,medications and showering instructions reviewed-Patient has hibiclens

## 2019-12-13 ENCOUNTER — ANESTHESIA EVENT (OUTPATIENT)
Dept: PERIOP | Facility: HOSPITAL | Age: 54
End: 2019-12-13
Payer: COMMERCIAL

## 2019-12-25 NOTE — ANESTHESIA PREPROCEDURE EVALUATION
Review of Systems/Medical History          Cardiovascular  Exercise tolerance (METS): >4,  Hyperlipidemia,    Pulmonary  Not a smoker , Sleep apnea CPAP,        GI/Hepatic            Endo/Other  Diabetes ,      GYN       Hematology   Musculoskeletal       Neurology   Psychology           Physical Exam    Airway    Mallampati score: I  TM Distance: >3 FB  Neck ROM: full     Dental   No notable dental hx     Cardiovascular      Pulmonary      Other Findings        Anesthesia Plan  ASA Score- 3     Anesthesia Type- general and regional with ASA Monitors  Additional Monitors:   Airway Plan: ETT  Comment: Left Interscalene Block  Plan Factors-Patient not instructed to abstain from smoking on day of procedure       Induction- intravenous  Postoperative Plan-     Informed Consent- Anesthetic plan and risks discussed with patient  I personally reviewed this patient with the CRNA  Discussed and agreed on the Anesthesia Plan with the CRNA  Hola Cisse Discussed with Patient the procedure for ISB, side effects including extended numbness of the extremity and potential for an incomplete Block  All questions answered  Consent given      Lab Results   Component Value Date    GLUC 157 (H) 11/19/2019    BUN 13 11/19/2019    CALCIUM 9 7 11/19/2019     11/19/2019    CO2 27 11/19/2019    CREATININE 0 90 11/19/2019    HGBA1C 7 0 (H) 12/11/2017    K 4 1 11/19/2019    PSA 3 6 01/23/2019

## 2019-12-26 ENCOUNTER — HOSPITAL ENCOUNTER (OUTPATIENT)
Facility: HOSPITAL | Age: 54
Setting detail: OUTPATIENT SURGERY
Discharge: HOME | End: 2019-12-26
Attending: ORTHOPAEDIC SURGERY | Admitting: ORTHOPAEDIC SURGERY
Payer: COMMERCIAL

## 2019-12-26 ENCOUNTER — ANESTHESIA (OUTPATIENT)
Dept: PERIOP | Facility: HOSPITAL | Age: 54
End: 2019-12-26
Payer: COMMERCIAL

## 2019-12-26 VITALS
RESPIRATION RATE: 18 BRPM | SYSTOLIC BLOOD PRESSURE: 142 MMHG | OXYGEN SATURATION: 96 % | TEMPERATURE: 98.5 F | HEART RATE: 82 BPM | WEIGHT: 168 LBS | HEIGHT: 65 IN | DIASTOLIC BLOOD PRESSURE: 91 MMHG | BODY MASS INDEX: 27.99 KG/M2

## 2019-12-26 DIAGNOSIS — S46.812A TRAUMATIC TEAR OF SUPRASPINATUS TENDON OF LEFT SHOULDER, INITIAL ENCOUNTER: Primary | ICD-10-CM

## 2019-12-26 PROBLEM — S43.431A SUPERIOR GLENOID LABRUM LESION OF RIGHT SHOULDER: Status: RESOLVED | Noted: 2018-03-06 | Resolved: 2019-12-26

## 2019-12-26 PROBLEM — S46.011A TRAUMATIC COMPLETE TEAR OF RIGHT ROTATOR CUFF: Status: RESOLVED | Noted: 2017-12-18 | Resolved: 2019-12-26

## 2019-12-26 LAB
GLUCOSE SERPL-MCNC: 137 MG/DL (ref 65–140)
GLUCOSE SERPL-MCNC: 150 MG/DL (ref 65–140)

## 2019-12-26 PROCEDURE — 29827 SHO ARTHRS SRG RT8TR CUF RPR: CPT | Performed by: PHYSICIAN ASSISTANT

## 2019-12-26 PROCEDURE — C9290 INJ, BUPIVACAINE LIPOSOME: HCPCS | Performed by: ANESTHESIOLOGY

## 2019-12-26 PROCEDURE — 29826 SHO ARTHRS SRG DECOMPRESSION: CPT | Performed by: ORTHOPAEDIC SURGERY

## 2019-12-26 PROCEDURE — 29826 SHO ARTHRS SRG DECOMPRESSION: CPT | Performed by: PHYSICIAN ASSISTANT

## 2019-12-26 PROCEDURE — C1713 ANCHOR/SCREW BN/BN,TIS/BN: HCPCS | Performed by: ORTHOPAEDIC SURGERY

## 2019-12-26 PROCEDURE — 99024 POSTOP FOLLOW-UP VISIT: CPT | Performed by: ORTHOPAEDIC SURGERY

## 2019-12-26 PROCEDURE — 29827 SHO ARTHRS SRG RT8TR CUF RPR: CPT | Performed by: ORTHOPAEDIC SURGERY

## 2019-12-26 PROCEDURE — 29828 SHO ARTHRS SRG BICP TENODSIS: CPT | Performed by: ORTHOPAEDIC SURGERY

## 2019-12-26 PROCEDURE — 29828 SHO ARTHRS SRG BICP TENODSIS: CPT | Performed by: PHYSICIAN ASSISTANT

## 2019-12-26 PROCEDURE — 82948 REAGENT STRIP/BLOOD GLUCOSE: CPT

## 2019-12-26 DEVICE — SYSTEM IMPLANT SPEEDBRIDGE W/4.75 BCMPS SWIVELOCK C: Type: IMPLANTABLE DEVICE | Site: SHOULDER | Status: FUNCTIONAL

## 2019-12-26 RX ORDER — ONDANSETRON 2 MG/ML
4 INJECTION INTRAMUSCULAR; INTRAVENOUS EVERY 8 HOURS PRN
Status: DISCONTINUED | OUTPATIENT
Start: 2019-12-26 | End: 2019-12-26 | Stop reason: HOSPADM

## 2019-12-26 RX ORDER — SODIUM CHLORIDE, SODIUM LACTATE, POTASSIUM CHLORIDE, CALCIUM CHLORIDE 600; 310; 30; 20 MG/100ML; MG/100ML; MG/100ML; MG/100ML
50 INJECTION, SOLUTION INTRAVENOUS CONTINUOUS
Status: DISCONTINUED | OUTPATIENT
Start: 2019-12-26 | End: 2019-12-26 | Stop reason: HOSPADM

## 2019-12-26 RX ORDER — ONDANSETRON 2 MG/ML
INJECTION INTRAMUSCULAR; INTRAVENOUS AS NEEDED
Status: DISCONTINUED | OUTPATIENT
Start: 2019-12-26 | End: 2019-12-26 | Stop reason: SURG

## 2019-12-26 RX ORDER — GLYCOPYRROLATE 0.2 MG/ML
INJECTION INTRAMUSCULAR; INTRAVENOUS AS NEEDED
Status: DISCONTINUED | OUTPATIENT
Start: 2019-12-26 | End: 2019-12-26 | Stop reason: SURG

## 2019-12-26 RX ORDER — DEXAMETHASONE SODIUM PHOSPHATE 10 MG/ML
INJECTION, SOLUTION INTRAMUSCULAR; INTRAVENOUS AS NEEDED
Status: DISCONTINUED | OUTPATIENT
Start: 2019-12-26 | End: 2019-12-26 | Stop reason: SURG

## 2019-12-26 RX ORDER — BUPIVACAINE HYDROCHLORIDE 5 MG/ML
INJECTION, SOLUTION EPIDURAL; INTRACAUDAL AS NEEDED
Status: DISCONTINUED | OUTPATIENT
Start: 2019-12-26 | End: 2019-12-26 | Stop reason: SURG

## 2019-12-26 RX ORDER — OXYCODONE HYDROCHLORIDE 5 MG/1
5 TABLET ORAL EVERY 4 HOURS PRN
Status: DISCONTINUED | OUTPATIENT
Start: 2019-12-26 | End: 2019-12-26 | Stop reason: HOSPADM

## 2019-12-26 RX ORDER — CEFAZOLIN SODIUM 1 G/50ML
SOLUTION INTRAVENOUS AS NEEDED
Status: DISCONTINUED | OUTPATIENT
Start: 2019-12-26 | End: 2019-12-26 | Stop reason: SURG

## 2019-12-26 RX ORDER — FENTANYL CITRATE 50 UG/ML
INJECTION, SOLUTION INTRAMUSCULAR; INTRAVENOUS AS NEEDED
Status: DISCONTINUED | OUTPATIENT
Start: 2019-12-26 | End: 2019-12-26 | Stop reason: SURG

## 2019-12-26 RX ORDER — SODIUM CHLORIDE, SODIUM LACTATE, POTASSIUM CHLORIDE, CALCIUM CHLORIDE 600; 310; 30; 20 MG/100ML; MG/100ML; MG/100ML; MG/100ML
125 INJECTION, SOLUTION INTRAVENOUS CONTINUOUS
Status: DISCONTINUED | OUTPATIENT
Start: 2019-12-26 | End: 2019-12-26 | Stop reason: HOSPADM

## 2019-12-26 RX ORDER — PROPOFOL 10 MG/ML
INJECTION, EMULSION INTRAVENOUS AS NEEDED
Status: DISCONTINUED | OUTPATIENT
Start: 2019-12-26 | End: 2019-12-26 | Stop reason: SURG

## 2019-12-26 RX ORDER — FENTANYL CITRATE/PF 50 MCG/ML
50 SYRINGE (ML) INJECTION
Status: DISCONTINUED | OUTPATIENT
Start: 2019-12-26 | End: 2019-12-26 | Stop reason: HOSPADM

## 2019-12-26 RX ORDER — MIDAZOLAM HYDROCHLORIDE 2 MG/2ML
INJECTION, SOLUTION INTRAMUSCULAR; INTRAVENOUS AS NEEDED
Status: DISCONTINUED | OUTPATIENT
Start: 2019-12-26 | End: 2019-12-26 | Stop reason: SURG

## 2019-12-26 RX ORDER — CEFAZOLIN SODIUM 1 G/50ML
1000 SOLUTION INTRAVENOUS ONCE
Status: DISCONTINUED | OUTPATIENT
Start: 2019-12-26 | End: 2019-12-26 | Stop reason: HOSPADM

## 2019-12-26 RX ORDER — LIDOCAINE HYDROCHLORIDE 10 MG/ML
INJECTION, SOLUTION EPIDURAL; INFILTRATION; INTRACAUDAL; PERINEURAL AS NEEDED
Status: DISCONTINUED | OUTPATIENT
Start: 2019-12-26 | End: 2019-12-26 | Stop reason: SURG

## 2019-12-26 RX ORDER — NEOSTIGMINE METHYLSULFATE 1 MG/ML
INJECTION INTRAVENOUS AS NEEDED
Status: DISCONTINUED | OUTPATIENT
Start: 2019-12-26 | End: 2019-12-26 | Stop reason: SURG

## 2019-12-26 RX ORDER — ONDANSETRON 2 MG/ML
4 INJECTION INTRAMUSCULAR; INTRAVENOUS ONCE AS NEEDED
Status: DISCONTINUED | OUTPATIENT
Start: 2019-12-26 | End: 2019-12-26

## 2019-12-26 RX ORDER — MORPHINE SULFATE 10 MG/ML
2 INJECTION, SOLUTION INTRAMUSCULAR; INTRAVENOUS EVERY 2 HOUR PRN
Status: DISCONTINUED | OUTPATIENT
Start: 2019-12-26 | End: 2019-12-26 | Stop reason: HOSPADM

## 2019-12-26 RX ORDER — ROCURONIUM BROMIDE 10 MG/ML
INJECTION, SOLUTION INTRAVENOUS AS NEEDED
Status: DISCONTINUED | OUTPATIENT
Start: 2019-12-26 | End: 2019-12-26 | Stop reason: SURG

## 2019-12-26 RX ORDER — CHLORHEXIDINE GLUCONATE 4 G/100ML
SOLUTION TOPICAL DAILY PRN
Status: DISCONTINUED | OUTPATIENT
Start: 2019-12-26 | End: 2019-12-26 | Stop reason: HOSPADM

## 2019-12-26 RX ORDER — ACETAMINOPHEN 325 MG/1
650 TABLET ORAL EVERY 4 HOURS PRN
Status: DISCONTINUED | OUTPATIENT
Start: 2019-12-26 | End: 2019-12-26 | Stop reason: HOSPADM

## 2019-12-26 RX ORDER — OXYCODONE HYDROCHLORIDE 5 MG/1
5 TABLET ORAL EVERY 4 HOURS PRN
Qty: 30 TABLET | Refills: 0 | Status: SHIPPED | OUTPATIENT
Start: 2019-12-26 | End: 2020-01-02

## 2019-12-26 RX ADMIN — NEOSTIGMINE METHYLSULFATE 3 MG: 1 INJECTION INTRAVENOUS at 11:26

## 2019-12-26 RX ADMIN — PHENYLEPHRINE HYDROCHLORIDE 100 MCG: 10 INJECTION INTRAVENOUS at 10:22

## 2019-12-26 RX ADMIN — LIDOCAINE HYDROCHLORIDE 50 MG: 10 INJECTION, SOLUTION EPIDURAL; INFILTRATION; INTRACAUDAL; PERINEURAL at 09:43

## 2019-12-26 RX ADMIN — PHENYLEPHRINE HYDROCHLORIDE 20 MCG/MIN: 10 INJECTION INTRAVENOUS at 10:02

## 2019-12-26 RX ADMIN — PHENYLEPHRINE HYDROCHLORIDE 100 MCG: 10 INJECTION INTRAVENOUS at 10:03

## 2019-12-26 RX ADMIN — MIDAZOLAM HYDROCHLORIDE 1 MG: 1 INJECTION, SOLUTION INTRAMUSCULAR; INTRAVENOUS at 09:26

## 2019-12-26 RX ADMIN — ONDANSETRON 4 MG: 2 INJECTION INTRAMUSCULAR; INTRAVENOUS at 10:00

## 2019-12-26 RX ADMIN — FENTANYL CITRATE 50 MCG: 50 INJECTION INTRAMUSCULAR; INTRAVENOUS at 09:26

## 2019-12-26 RX ADMIN — SODIUM CHLORIDE, SODIUM LACTATE, POTASSIUM CHLORIDE, AND CALCIUM CHLORIDE 125 ML/HR: .6; .31; .03; .02 INJECTION, SOLUTION INTRAVENOUS at 09:03

## 2019-12-26 RX ADMIN — ROCURONIUM BROMIDE 40 MG: 10 SOLUTION INTRAVENOUS at 09:45

## 2019-12-26 RX ADMIN — CEFAZOLIN SODIUM 1000 MG: 1 SOLUTION INTRAVENOUS at 09:38

## 2019-12-26 RX ADMIN — BUPIVACAINE 20 ML: 13.3 INJECTION, SUSPENSION, LIPOSOMAL INFILTRATION at 09:31

## 2019-12-26 RX ADMIN — BUPIVACAINE HYDROCHLORIDE 5 ML: 5 INJECTION, SOLUTION EPIDURAL; INTRACAUDAL at 09:31

## 2019-12-26 RX ADMIN — MIDAZOLAM HYDROCHLORIDE 1 MG: 1 INJECTION, SOLUTION INTRAMUSCULAR; INTRAVENOUS at 09:36

## 2019-12-26 RX ADMIN — GLYCOPYRROLATE 0.4 MG: 0.2 INJECTION, SOLUTION INTRAMUSCULAR; INTRAVENOUS at 11:26

## 2019-12-26 RX ADMIN — DEXAMETHASONE SODIUM PHOSPHATE 4 MG: 10 INJECTION, SOLUTION INTRAMUSCULAR; INTRAVENOUS at 10:00

## 2019-12-26 RX ADMIN — PROPOFOL 180 MG: 10 INJECTION, EMULSION INTRAVENOUS at 09:44

## 2019-12-26 NOTE — ANESTHESIA PROCEDURE NOTES
Peripheral Block    Patient location during procedure: holding area  Start time: 12/26/2019 9:20 AM  Reason for block: procedure for pain and at surgeon's request  Staffing  Anesthesiologist: Alisia Torres MD  Performed: anesthesiologist   Preanesthetic Checklist  Completed: patient identified, site marked, surgical consent, pre-op evaluation, timeout performed, IV checked, risks and benefits discussed and monitors and equipment checked  Peripheral Block  Patient position: sitting  Prep: DuraPrep  Patient monitoring: heart rate, continuous pulse ox and frequent blood pressure checks  Block type: interscalene  Laterality: left  Injection technique: single-shot  Procedures: ultrasound guided, Ultrasound guidance required for the procedure to increase accuracy and safety of medication placement and decrease risk of complications  Ultrasound permanent image saved  Needle  Needle type: Stimuplex   Needle gauge: 20G    Needle length: 10 cm  Needle localization: ultrasound guidance  Needle insertion depth: 1 5 cm  Assessment  Injection assessment: local visualized surrounding nerve on ultrasound, incremental injection, negative aspiration for heme and transient paresthesias  Paresthesia pain: immediately resolved  Heart rate change: no  Slow fractionated injection: yes  Post-procedure:  site cleaned  patient tolerated the procedure well with no immediate complications

## 2019-12-26 NOTE — H&P
Patient Name:  Clemente Guillen  MRN:  [id number]      Assessment & Plan     Left shoulder rotator cuff tear for left shoulder arthroscopic rotator cuff repair      Chief Complaint     Left shoulder pain      History of the Present Illness     Clemente Guillen is a 48 y o  male with a traumatic tear of his left rotator cuff after a skiing injury in March 2019  He presents for left shoulder arthroscopic rotator cuff repair  Physical Exam     /92   Pulse 82   Temp 98 7 °F (37 1 °C) (Temporal)   Resp 16   Ht 5' 5" (1 651 m)   Wt 76 2 kg (168 lb)   SpO2 96%   BMI 27 96 kg/m²     Left shoulder: Tenderness to palpation anterior aspect  Full range of motion  Impingement signs are positive  Empty can test is positive  Speed's test is positive  Constitutional:  Well-developed and well-nourished  Eyes:  Anicteric sclerae  Neck:  Supple  Lungs:  Clear to auscultation bilaterally  Heart:  Regular rate and rhythm with audible S1 and S2   Skin:  Intact without erythema  Neurologic:  Sensation intact to light touch  Psychiatric:  Mood and affect are appropriate  Data Review     I have personally reviewed pertinent lab results      Lab Results   Component Value Date    K 4 1 11/19/2019     11/19/2019    CO2 27 11/19/2019    BUN 13 11/19/2019    CREATININE 0 90 11/19/2019     No results found for: WBC, HGB, PLT  No results found for: PT, INR, PTT    Past Medical History:   Diagnosis Date    Diabetes mellitus ([address] )     controlled with exercise and diet    Sleep apnea     uses CPAP at night       Past Surgical History:   Procedure Laterality Date    HERNIA REPAIR      ME ARTHROSCOPY SHOULDER SURGICAL BICEPS TENODESIS Right 12/18/2017    Procedure: ARTHROSCOPIC BICEPS TENODESIS;  Surgeon: Marvin Salgado MD;  [facility];  Service: Orthopedics    ME SHLDR ARTHROSCOP,SURG,W/ROTAT CUFF REPR Right 12/18/2017    Procedure: RIGHT SHOULDER ARTHROSCOPIC ROTATOR CUFF REPAIR;  Surgeon: Marvin Salgado MD;  [facility];  Service: Orthopedics       Allergies   Allergen Reactions    Cumin Oil Anaphylaxis     Has an EPI pen  Has an EPI pen       No current facility-administered medications on file prior to encounter  Current Outpatient Medications on File Prior to Encounter   Medication Sig Dispense Refill    Cholecalciferol (VITAMIN D3) 1000 units CAPS Take by mouth      Glucosamine-Chondroitin (MOVE FREE PO) Take by mouth      glucose blood test strip Use as directed , tests FBS  daily      Lancets MISC Use as directed, tests daily      Multiple Vitamin (MULTIVITAMIN) capsule Take 1 capsule by mouth daily      Omega-3 1000 MG CAPS Take by mouth         Social History     Tobacco Use    Smoking status: Never Smoker    Smokeless tobacco: Never Used   Substance Use Topics    Alcohol use: Yes     Frequency: Monthly or less     Drinks per session: 1 or 2     Comment: occ    Drug use: No       Family History   Problem Relation Age of Onset    Hypertension Father     Cancer Father     Diabetes Maternal Grandmother     Heart disease Maternal Grandmother        Review of Systems     As stated in the HPI  All other systems were reviewed and are negative

## 2019-12-26 NOTE — OP NOTE
OPERATIVE REPORT  PATIENT NAME: Mattie Piper    :  1965  MRN: [id number]  Pt [facility]: [facility]    SURGERY DATE: 2019    Surgeon(s) and Role:     * Rossana Davila MD - Primary     * Kenya Mejia PA-C - Assisting    Pre-Op Diagnosis Codes:     * Traumatic tear of supraspinatus tendon of left shoulder [S46 812D]    Post-Op Diagnosis Codes:     * Traumatic tear of supraspinatus tendon of left shoulder [S46 812D]     * Superior glenoid labrum lesion of left shoulder [S43 432A]     * Degenerative tear of glenoid labrum of left shoulder [M24 112]    Procedure(s) (LRB):  SHOULDER ARTHROSCOPIC ROTATOR CUFF  REPAIR; BICEPS TENODESIS; SUBACROMIAL DECOMPRESSION; LABRAL DEBRIDEMENT (Left)    Specimen(s):  * No specimens in log *    Estimated Blood Loss:   Minimal    Drains:  * No LDAs found *    Anesthesia Type:   General w/ Interscalene Block    Complications:   None    Procedure and Technique:  The patient was identified in the preoperative holding area, and the surgical site was marked by the surgeon  Interscalene block was administered by the anesthesiologist in the holding area  The patient was transported to the operating room and transferred to the OR table in the supine position  General anesthesia was administered, and the patient was intubated  The patient was then placed in the beach chair position with sequential compression devices on bilateral lower extremities  The left shoulder was prepped and draped in the usual sterile fashion  Prophylactic antibiotics were given within 1 hour of incision  Following a surgical timeout, a posterior arthroscopy portal was established with the 15-blade scalpel, and the 4 mm 30° arthroscopic camera was placed in the glenohumeral joint  An anterior portal was established under direct visualization with the aid of a spinal needle  A type II SLAP tear was present with a second area of degenerative tearing involving the anteroinferior labrum   The intra-articular portion of the long head of the biceps tendon demonstrated a longitudinal split tear proximal to the bicipital groove  The subscapularis tendon demonstrated intra-substance tearing near the insertion without footprint disruption  The articular surfaces of the humeral head and glenoid were intact  The supraspinatus tendon was torn with a partial-thickness articular-sided tear of the infraspinatus tendon anteriorly  The teres minor tendon was intact  The anteroinferior labrum was debrided using the motorized shaver  Attention was turned to the subacromial space  A lateral arthroscopy portal was established under direct visualization with the aid of a spinal needle  Severe subacromial bursitis was present  The subacromial bursa was debrided with the motorized shaver  The undersurface of the acromion was exposed and demonstrated a moderate-sized anterior subacromial spur  Partial acromioplasty was performed with the bucky for a subacromial decompression  The rotator cuff tear was identified from the bursal side  An anterolateral arthroscopy portal was established under direct visualization with the aid of a spinal needle  The rotator cuff footprint was partially decorticated with the shaver  Two Arthrex 4 75 mm BioComposite SwiveLock C suture anchors with preloaded FiberTape Loop suture were placed along the articular margin of the humeral head medially  The FiberWire suture from the anterior anchor was passed through the long head of the biceps tendon, which was then released from the biceps anchor  The FiberWire suture was secured using arthroscopic knot-tying techniques to complete the biceps tenodesis  A side-to-side Orthocord suture was passed through the supraspinatus tendon and rotator interval at the mid-portion of the rotator cuff tear and secured using arthroscopic knot-tying techniques   The SwiveLock suture loops were passed through the rotator cuff tendon using the FastPass Scorpion suture passer  The suture loops were cut, and one limb of each suture was loaded on a 4 75 mm BioComposite SwiveLock C suture anchor  The suture anchor eyelet was advanced to the edge of the prepared bone socket, and tension was applied to the sutures to reduce and compress the rotator cuff tendon to the bone  The anchor was inserted, and the suture tails were cut flush to the anchor  A second 4 75 mm BioComposite SwiveLock C suture anchor was inserted in a similar fashion to complete the rotator cuff repair  A stable repair construct was confirmed with the arthroscopic probe  The shoulder was irrigated extensively with arthroscopic irrigation fluid  The portal sites were closed with simple buried 3-0 Monocryl sutures and skin adhesive  Mepilex dressings were applied  The operative arm was immobilized in an abduction sling  Anesthesia was reversed, and the patient was extubated  The patient was transferred to the stretcher and transported to the recovery area in stable condition  The assistance of an advanced practitioner was necessary for sterile draping, positioning of the operative extremity, suture management, and holding the arthroscopic camera during suture anchor insertion  A qualified resident physician was not available      Patient Disposition:  PACU  and extubated and stable    SIGNATURE: Vandana Up MD  DATE: December 26, 2019  TIME: 11:29 AM

## 2020-01-07 ENCOUNTER — OFFICE VISIT (OUTPATIENT)
Dept: OBGYN CLINIC | Facility: CLINIC | Age: 55
End: 2020-01-07

## 2020-01-07 VITALS
SYSTOLIC BLOOD PRESSURE: 161 MMHG | HEIGHT: 64 IN | BODY MASS INDEX: 29.02 KG/M2 | DIASTOLIC BLOOD PRESSURE: 94 MMHG | WEIGHT: 170 LBS | HEART RATE: 68 BPM

## 2020-01-07 DIAGNOSIS — S46.812D TRAUMATIC TEAR OF SUPRASPINATUS TENDON OF LEFT SHOULDER, SUBSEQUENT ENCOUNTER: Primary | ICD-10-CM

## 2020-01-07 DIAGNOSIS — M25.512 ACUTE PAIN OF LEFT SHOULDER: ICD-10-CM

## 2020-01-07 PROCEDURE — 99024 POSTOP FOLLOW-UP VISIT: CPT | Performed by: ORTHOPAEDIC SURGERY

## 2020-01-07 NOTE — PROGRESS NOTES
Patient Name:  Megha Serna  MRN:  [id number]    Assessment & Plan     Left Shoulder Arthroscopic Rotator Cuff Repair; Biceps Tenodesis; Subacromial Decompression; Labral Debridement done 12/26/19    1  Start physical therapy as scheduled per protocol  2  Continue sling for a total of 6 weeks  3  Patient may work from home starting 1/13/20, 4 hours per day, 5 days per week this will be re-evaluated at the next visit  4  Follow up 4 weeks    Subjective     Patient presents today PO left  Shoulder Arthroscopic Rotator Cuff Repair; Biceps Tenodesis; Subacromial Decompression; Labral Debridement done 12/26/19  General ROS:  Negative for fever or chills  Neurological ROS:  Negative for numbness or tingling      Objective     /94   Pulse 68   Ht 5' 4" (1 626 m)   Wt 77 1 kg (170 lb)   BMI 29 18 kg/m²     Left  shoulder:  Deformity: None  Incision: portal sites are clean, dry, and intact  Tenderness to palpation: normal PO tenderness  Range of motion: not tested  Strength:not tested    Psychiatric: Mood and affect are appropriate    Data Review     Not performed    Social History     Tobacco Use    Smoking status: Never Smoker    Smokeless tobacco: Never Used   Substance Use Topics    Alcohol use: Yes     Frequency: Monthly or less     Drinks per session: 1 or 2     Comment: occ    Drug use: No       Scribe Attestation    I,:   Arabella Ball am acting as a scribe while in the presence of the attending physician :        I,:   Miranda Glover MD personally performed the services described in this documentation    as scribed in my presence :

## 2020-01-07 NOTE — LETTER
January 7, 2020     Patient: Milla Dias   YOB: 1965   Date of Visit: 1/7/2020       To Whom it May Concern:    Elda Joey is under my professional care  He was seen in my office on 1/7/2020  He may work from home 4 hours per day, 5 days per week  Restrictions will be re-evaluated at the next visit  If you have any questions or concerns, please don't hesitate to call           Sincerely,          Rose Laughlin MD        CC: No Recipients

## 2020-01-09 ENCOUNTER — EVALUATION (OUTPATIENT)
Dept: PHYSICAL THERAPY | Facility: CLINIC | Age: 55
End: 2020-01-09
Payer: COMMERCIAL

## 2020-01-09 DIAGNOSIS — Z51.89 AFTERCARE: ICD-10-CM

## 2020-01-09 DIAGNOSIS — S46.812D TRAUMATIC TEAR OF SUPRASPINATUS TENDON OF LEFT SHOULDER, SUBSEQUENT ENCOUNTER: Primary | ICD-10-CM

## 2020-01-09 PROCEDURE — 97140 MANUAL THERAPY 1/> REGIONS: CPT | Performed by: PHYSICAL THERAPIST

## 2020-01-09 PROCEDURE — 97110 THERAPEUTIC EXERCISES: CPT | Performed by: PHYSICAL THERAPIST

## 2020-01-09 PROCEDURE — 97162 PT EVAL MOD COMPLEX 30 MIN: CPT | Performed by: PHYSICAL THERAPIST

## 2020-01-09 NOTE — PROGRESS NOTES
PT Evaluation     Today's date: 2020  Patient name: Anne Nelson  : 1965  MRN: [id number]  Referring provider: Sayda Moore MD  Dx:   Encounter Diagnosis     ICD-10-CM    1  Traumatic tear of supraspinatus tendon of left shoulder, subsequent encounter S46 812D Ambulatory referral to Physical Therapy   2  [facility] Z51 [id number]                   Assessment  Assessment details: Pt presents with signs and symptoms synonymous of admitting diagnosis and is in good condition as symptoms were minimal during strong first assessment of PROM  Pt presents with pain, decreased strength, decreased range, flexibility, as well as tolerance to activity and postural awareness  Pt would benefit skilled PT intervention in order to proceed with protocol and address these impairments in order to be able to perform all desired activities with minimal to nil symptom exacerbation over proper timing  Thank you very much for this referral      Impairments: abnormal or restricted ROM, activity intolerance, lacks appropriate home exercise program, pain with function and poor posture   Understanding of Dx/Px/POC: good   Prognosis: good    Goals  STG 4 Weeks:  Decrease pain at worst to 3  Improve range to to PROM protocol   Improve strength to when cleared for assessment     Independent with HEP   MTG 8 Weeks:  Decrease pain at worst to 2   Improve range to within 15-20 of contralateral shoulder or protocol pending   Improve strength to 3+   LTG 12 Weeks:  Decrease pain at worst to 1/10  Improve range to within 5-10 of contralateral shoulder   Improve strength to 4- or better  Able to perform all desired activities with minimal to nil symptom exacerbation  LTG 16:  Assess prn    Plan  Patient would benefit from: skilled physical therapy  Planned modality interventions: cryotherapy, thermotherapy: hydrocollator packs and TENS  Planned therapy interventions: joint mobilization, manual therapy, neuromuscular re-education, patient education, postural training, strengthening, stretching, therapeutic activities, therapeutic exercise, home exercise program, graded motor, graded exercise, graded activity, functional ROM exercises and flexibility  Frequency: 2x week  Duration in weeks: 12  Treatment plan discussed with: patient        Subjective Evaluation    History of Present Illness  Date of onset: 2020  Date of surgery: 2019  Mechanism of injury: Pt is a 48 yomale who is RHD dominant presents today s/p L RTC repair with Tenodesis, SAD and Labral debriedment performed by Dr Romana Adler on 19  Pt reports this all began when he was skiing last March he had hit an ice patch and landed on the shoulder  Pt reports when he got up he was not able to move his arm  He reports that he thought it was "dead "  Pt reports that he met with Dr Romana Adler shortly after the incident and the decision to see if it would heal on his own  Pt reports he followed up with him again in July and he had begun to have pain, and was losing strength again  Pt reports the MRI had performed which was indicative of RTC tear  Pt had to post post pone his original surgical intervention because of insurance components with pt's job  He finally after several months of trying to figuring everything out came back to Dr Romana Adler and thus presents today  Pt reports he does have periods of time without pain  Pt reports with accidental movement can get up to a 4/10  Pt reports sleeping is going fairly well and he is sleeping in his bed quite propped up  Pt reports no numbness or tingling in his hand and states that he follows up with Dr Romana Adler on 19  Pt reports that his goals at this time are to be able to proceed with his protocol and get back to where he was prior to injury    Denies change in bowel or bladder  Quality of life: good    Pain  Current pain ratin  At best pain ratin  At worst pain ratin  Quality: dull ache and sharp  Relieving factors: ice and medications  Exacerbated by: accidental movement  Progression: improved      Diagnostic Tests  X-ray: normal  MRI studies: abnormal  Treatments  Previous treatment: medication  Patient Goals  Patient goals for therapy: decreased pain, increased motion, increased strength and return to sport/leisure activities          Objective     Passive Range of Motion   Left Shoulder   Flexion: 110 (AROM NT) degrees   Abduction: 100 (AROM NT) degrees   External rotation 0°: 15 (AROM NT) degrees   Internal rotation 0°: 45 (AROM NT) degrees     Right Shoulder   Flexion: Right shoulder passive forward flexion: AROM 165  WFL  Abduction: Right shoulder passive abduction: AROM 160  [facility]  External rotation 0°: Right shoulder passive external rotation at 0 degrees: AROM C6  [facility]  Internal rotation 0°: Right shoulder passive internal rotation at 0 degrees: AROM T10  [facility]    Additional Passive Range of Motion Details  Forward head, rounded shoulders  B/L Sensation intact to light touch C3,4,5,6,7,8,T1,T2   strong and painless B  Shoulder Strength   L NT  R Flex 5 Abd 5 ER 5 IR 5  Elbow Range R L WFL B (L performed PROM)   CS Screen WFL and without pain  Portals: x 4, scabbing evident, no signs of infection  Precautions: Protocol RTCR + Tenodesis + Labral Debriedment + SAD  Pre DM      Daily Treatment Diary       Manual 1/9            PROM L shoulder 10                                                                Exercise Diary             CS Range 6" x 10            Scap Add 10" x 10            pendulums review            Elbow PROM 6" x 10            Hand/putty Gripping 2-3 min            Shoulder isometrics flex abd ext trial as able                                                                                                                                                                                                                Modalities             CP to L shoulder prn

## 2020-01-13 ENCOUNTER — OFFICE VISIT (OUTPATIENT)
Dept: PHYSICAL THERAPY | Facility: CLINIC | Age: 55
End: 2020-01-13
Payer: COMMERCIAL

## 2020-01-13 DIAGNOSIS — Z51.89 AFTERCARE: ICD-10-CM

## 2020-01-13 DIAGNOSIS — S46.812D TRAUMATIC TEAR OF SUPRASPINATUS TENDON OF LEFT SHOULDER, SUBSEQUENT ENCOUNTER: Primary | ICD-10-CM

## 2020-01-13 PROCEDURE — 97110 THERAPEUTIC EXERCISES: CPT

## 2020-01-13 PROCEDURE — 97140 MANUAL THERAPY 1/> REGIONS: CPT

## 2020-01-13 NOTE — PROGRESS NOTES
Daily Note     Today's date: 2020  Patient name: Anne Nelson  : 1965  MRN: [id number]  Referring provider: Sayda Moore MD  Dx:   Encounter Diagnosis     ICD-10-CM    1  Traumatic tear of supraspinatus tendon of left shoulder, subsequent encounter S46 812D    2  Aftercare Z51 89                   Subjective: Patient states that he has been maximally compliant with HEP   Noticed an increase in pain but symptoms have decreased since  Objective: See treatment diary below      Assessment: Tolerated treatment fair  Patient exhibited good technique with therapeutic exercises       Plan: Continue per plan of care  Precautions: Protocol RTCR + Tenodesis + Labral Debriedment + SAD  Pre DM      Daily Treatment Diary       Manual            PROM L shoulder 10 10                                                                Exercise Diary             CS Range 6" x 10 6"x10           Scap Add 10" x 10 :10x10           pendulums review 2x10           Elbow PROM 6" x 10 6"x10           Hand/putty Gripping 2-3 min yellow 3 min           Shoulder isometrics flex abd ext trial as able                                                                                                                                                                                                                Modalities             CP to L shoulder prn

## 2020-01-16 ENCOUNTER — OFFICE VISIT (OUTPATIENT)
Dept: PHYSICAL THERAPY | Facility: CLINIC | Age: 55
End: 2020-01-16
Payer: COMMERCIAL

## 2020-01-16 DIAGNOSIS — Z51.89 AFTERCARE: ICD-10-CM

## 2020-01-16 DIAGNOSIS — S46.812D TRAUMATIC TEAR OF SUPRASPINATUS TENDON OF LEFT SHOULDER, SUBSEQUENT ENCOUNTER: Primary | ICD-10-CM

## 2020-01-16 PROCEDURE — 97140 MANUAL THERAPY 1/> REGIONS: CPT | Performed by: PHYSICAL THERAPIST

## 2020-01-16 PROCEDURE — 97110 THERAPEUTIC EXERCISES: CPT | Performed by: PHYSICAL THERAPIST

## 2020-01-20 ENCOUNTER — OFFICE VISIT (OUTPATIENT)
Dept: PHYSICAL THERAPY | Facility: CLINIC | Age: 55
End: 2020-01-20
Payer: COMMERCIAL

## 2020-01-20 DIAGNOSIS — Z51.89 AFTERCARE: ICD-10-CM

## 2020-01-20 DIAGNOSIS — S46.812D TRAUMATIC TEAR OF SUPRASPINATUS TENDON OF LEFT SHOULDER, SUBSEQUENT ENCOUNTER: Primary | ICD-10-CM

## 2020-01-20 PROCEDURE — 97140 MANUAL THERAPY 1/> REGIONS: CPT

## 2020-01-20 PROCEDURE — 97110 THERAPEUTIC EXERCISES: CPT

## 2020-01-20 NOTE — PROGRESS NOTES
Daily Note     Today's date: 2020  Patient name: Maddie Jean  : 1965  MRN: [id number]  Referring provider: Claude Horta MD  Dx:   Encounter Diagnosis     ICD-10-CM    1  Traumatic tear of supraspinatus tendon of left shoulder, subsequent encounter S46 812D    2  Aftercare Z51 89                   Subjective: Some soreness this weekend which he attributes to the weather  Remains maximally compliant with HEP and wearing sling  Objective: See treatment diary below      Assessment: Tolerated treatment well  Patient will be able to progress per MD protocol at nv  Plan: Continue per plan of care  Precautions: Protocol RTCR + Tenodesis + Labral Debriedment + SAD  Pre DM      Daily Treatment Diary       Manual          PROM L shoulder 10 10  10 min 10 min                                                              Exercise Diary             CS Range 6" x 10 6"x10 6"x  10 6"x 10          Scap Add 10" x 10 :10x10 10" x 10 :10x10          pendulums review 2x10 2 x 10 2 x 10          Elbow PROM 6" x 10 6"x10 6" x 10 6" x 10          Hand/putty Gripping 2-3 min yellow 3 min yellow Red 3 min          Shoulder isometrics flex abd ext trial as able   6" x 10  6" x 10                                                                                                                                                                                                             Modalities             CP to L shoulder prn    10

## 2020-01-23 ENCOUNTER — OFFICE VISIT (OUTPATIENT)
Dept: PHYSICAL THERAPY | Facility: CLINIC | Age: 55
End: 2020-01-23
Payer: COMMERCIAL

## 2020-01-23 DIAGNOSIS — S46.812D TRAUMATIC TEAR OF SUPRASPINATUS TENDON OF LEFT SHOULDER, SUBSEQUENT ENCOUNTER: Primary | ICD-10-CM

## 2020-01-23 DIAGNOSIS — Z51.89 AFTERCARE: ICD-10-CM

## 2020-01-23 PROCEDURE — 97140 MANUAL THERAPY 1/> REGIONS: CPT | Performed by: PHYSICAL THERAPIST

## 2020-01-23 PROCEDURE — 97110 THERAPEUTIC EXERCISES: CPT | Performed by: PHYSICAL THERAPIST

## 2020-01-23 NOTE — PROGRESS NOTES
Daily Note     Today's date: 2020  Patient name: Alize Salcedo  : 1965  MRN: [id number]  Referring provider: Flor Dominguez MD  Dx:   Encounter Diagnosis     ICD-10-CM    1  Aftercare Z51 89    2  Traumatic tear of supraspinatus tendon of left shoulder, subsequent encounter S46 253I                   Subjective: Pt presents today stating that he was a little sore after isometrics, but as a whole he is feeling well  Objective: See treatment diary below      Assessment: Proceeded with AAROM activities with fair tolerance, improved with reps  Continue to progress with pulleys n v  As able  Plan: Continue per plan of care  Precautions: Protocol RTCR + Tenodesis + Labral Debriedment + SAD  Pre DM      Daily Treatment Diary       Manual         PROM L shoulder 10 10  10 min 10 min  10 min                                                            Exercise Diary             CS Range 6" x 10 6"x10 6"x  10 6"x 10  6" x 10        Scap Add 10" x 10 :10x10 10" x 10 :10x10  10" x 10        pendulums review 2x10 2 x 10 2 x 10  2 x 10        Elbow PROM 6" x 10 6"x10 6" x 10 6" x 10  6"x  10        Hand/putty Gripping 2-3 min yellow 3 min yellow Red 3 min  Red 3 min        Shoulder isometrics flex abd ext trial as able   6" x 10  6" x 10  6" x 10        Table slides flexion abd ER     6" x10                                                                                                                                                                                              Modalities             CP to L shoulder prn    10 10 min

## 2020-01-30 ENCOUNTER — OFFICE VISIT (OUTPATIENT)
Dept: PHYSICAL THERAPY | Facility: CLINIC | Age: 55
End: 2020-01-30
Payer: COMMERCIAL

## 2020-01-30 DIAGNOSIS — Z51.89 AFTERCARE: ICD-10-CM

## 2020-01-30 DIAGNOSIS — S46.812D TRAUMATIC TEAR OF SUPRASPINATUS TENDON OF LEFT SHOULDER, SUBSEQUENT ENCOUNTER: Primary | ICD-10-CM

## 2020-01-30 PROCEDURE — 97140 MANUAL THERAPY 1/> REGIONS: CPT

## 2020-01-30 PROCEDURE — 97110 THERAPEUTIC EXERCISES: CPT

## 2020-01-30 NOTE — PROGRESS NOTES
Daily Note     Today's date: 2020  Patient name: Anne Nelson  : 1965  MRN: [id number]  Referring provider: Sayda Moore MD  Dx:   Encounter Diagnosis     ICD-10-CM    1  Traumatic tear of supraspinatus tendon of left shoulder, subsequent encounter S46 812D    2  Aftercare Z51 89                   Subjective: Patient experienced some soreness intermittently this weekend in anterior L shoulder  Symptoms passed quickly  Remains maximally compliant with HEP  Patient has f/u with MD next week  Able to progress per MD protocol next Thursday  Objective: See treatment diary below      Assessment: Tolerated treatment well  Patient exhibited good technique with therapeutic exercises      Plan: Continue per plan of care  Precautions: Protocol RTCR + Tenodesis + Labral Debriedment + SAD  Pre DM      Daily Treatment Diary       Manual         PROM L shoulder 10 10  10 min 10 min  10 min 10 min                                                            Exercise Diary             CS Range 6" x 10 6"x10 6"x  10 6"x 10  6" x 10        Scap Add 10" x 10 :10x10 10" x 10 :10x10  10" x 10 :10x10        pendulums review 2x10 2 x 10 2 x 10  2 x 10 2 x 10       Elbow PROM 6" x 10 6"x10 6" x 10 6" x 10  6"x  10 6"x 10        Hand/putty Gripping 2-3 min yellow 3 min yellow Red 3 min  Red 3 min Red 3 min        Shoulder isometrics flex abd ext trial as able   6" x 10  6" x 10  6" x 10 6" x 10        Table slides flexion abd ER     6" x10 6" x 10                                                                                                                                                                                              Modalities             CP to L shoulder prn    10 10 min 10 min

## 2020-02-04 ENCOUNTER — OFFICE VISIT (OUTPATIENT)
Dept: OBGYN CLINIC | Facility: CLINIC | Age: 55
End: 2020-02-04

## 2020-02-04 VITALS
HEART RATE: 73 BPM | SYSTOLIC BLOOD PRESSURE: 138 MMHG | HEIGHT: 64 IN | WEIGHT: 170 LBS | DIASTOLIC BLOOD PRESSURE: 91 MMHG | BODY MASS INDEX: 29.02 KG/M2

## 2020-02-04 DIAGNOSIS — S46.812A TRAUMATIC TEAR OF SUPRASPINATUS TENDON OF LEFT SHOULDER, INITIAL ENCOUNTER: Primary | ICD-10-CM

## 2020-02-04 DIAGNOSIS — S43.431D SUPERIOR GLENOID LABRUM LESION OF RIGHT SHOULDER, SUBSEQUENT ENCOUNTER: ICD-10-CM

## 2020-02-04 PROCEDURE — 99024 POSTOP FOLLOW-UP VISIT: CPT | Performed by: PHYSICIAN ASSISTANT

## 2020-02-04 NOTE — PROGRESS NOTES
Patient Name:  Norberto Garnica  MRN:  [id number]    Assessment     1  Traumatic tear of supraspinatus tendon of left shoulder, initial encounter     2  Superior glenoid labrum lesion of right shoulder, subsequent encounter         Plan     Left Shoulder Arthroscopic Rotator Cuff Repair; Biceps Tenodesis; Subacromial Decompression; Labral Debridement 12/26/19  1  Continue physical therapy per protocol  2  Wean from sling as tolerated  3  Separate work forms completed  4  Follow-up in six weeks    Subjective     61-year-old male returns to the office today status post Left Shoulder Arthroscopic Rotator Cuff Repair; Biceps Tenodesis; Subacromial Decompression; Labral Debridement 12/26/19  Today he notes overall improvement  He still notes mild soreness about the shoulder specifically after physical therapy and first thing in the morning  He continues to utilize the sling  He has been participating in formal physical therapy notes significant improvements  He denies any instability  No numbness or tingling  No fevers or chills  He is happy with his progress  Objective     /91   Pulse 73   Ht 5' 4" (1 626 m)   Wt 77 1 kg (170 lb)   BMI 29 18 kg/m²     Left shoulder:  No gross deformity  Skin intact  Fully healed incisions  No tenderness to palpation  Passive range of motion includes 120° of forward flexion, 70° of external rotation-abduction, 30° of internal rotation-abduction  Strength testing is deferred  Sensation intact axillary, median, ulnar and radial nerves  2+ radial pulse

## 2020-02-06 ENCOUNTER — OFFICE VISIT (OUTPATIENT)
Dept: PHYSICAL THERAPY | Facility: CLINIC | Age: 55
End: 2020-02-06
Payer: COMMERCIAL

## 2020-02-06 DIAGNOSIS — Z51.89 AFTERCARE: ICD-10-CM

## 2020-02-06 DIAGNOSIS — S46.812D TRAUMATIC TEAR OF SUPRASPINATUS TENDON OF LEFT SHOULDER, SUBSEQUENT ENCOUNTER: Primary | ICD-10-CM

## 2020-02-06 PROCEDURE — 97110 THERAPEUTIC EXERCISES: CPT | Performed by: PHYSICAL THERAPIST

## 2020-02-06 PROCEDURE — 97140 MANUAL THERAPY 1/> REGIONS: CPT | Performed by: PHYSICAL THERAPIST

## 2020-02-06 NOTE — PROGRESS NOTES
PT Evaluation     Today's date: 2020  Patient name: Clemente Guillen  : 1965  MRN: [id number]  Referring provider: Bonnie Landon MD  Dx:   Encounter Diagnosis     ICD-10-CM    1  Traumatic tear of supraspinatus tendon of left shoulder, subsequent encounter S46 812D    2  Aftercare Z51 89                   Assessment  Assessment details: Pt is progressing very well at this time  They have demonstrated improvement in range, strength, tolerance to activity and postural awareness as well as pain levels  They have achieved all STGs sought out for them as well as by them  They will benefit continued Skilled PT intervention in order to achieve all LTGs sought out for them as well as by them in order to perform all desired activities with minimal to nil symptom exacerbation as well as proceed through protocol    Thank you very much for this kind and motivated referral         Impairments: abnormal or restricted ROM, activity intolerance, lacks appropriate home exercise program, pain with function and poor posture   Understanding of Dx/Px/POC: good   Prognosis: good    Goals  STG 4 Weeks:  Decrease pain at worst to 3 -met  Improve range to to PROM protocol  -met  Improve strength to when cleared for assessment   -met  Independent with HEP  -met  MTG 8 Weeks:  Decrease pain at worst to 2   Improve range to within 15-20 of contralateral shoulder or protocol pending   Improve strength to 3+   LTG 12 Weeks:  Decrease pain at worst to 1/10  Improve range to within 5-10 of contralateral shoulder   Improve strength to 4- or better  Able to perform all desired activities with minimal to nil symptom exacerbation  LTG 16:  Assess prn    Plan  Patient would benefit from: skilled physical therapy  Planned modality interventions: cryotherapy, thermotherapy: hydrocollator packs and TENS  Planned therapy interventions: joint mobilization, manual therapy, neuromuscular re-education, patient education, postural training, strengthening, stretching, therapeutic activities, therapeutic exercise, home exercise program, graded motor, graded exercise, graded activity, functional ROM exercises and flexibility  Frequency: 2x week  Duration in weeks: 12  Treatment plan discussed with: patient        Subjective Evaluation    History of Present Illness  Date of onset: 2020  Date of surgery: 2019  Mechanism of injury: Pt is now 6 weeks out from Surgery and presents today stating that he is feeling well  States pain at worst is 3/10, followed up with physician who is very content with progression  Sleeping has improved, HEP is going well and is eager to continue to progress with his protocol  Follows up again with physician on 3/10/2020  Quality of life: good    Pain  Current pain ratin  At best pain ratin  At worst pain ratin  Quality: dull ache and sharp  Relieving factors: ice and medications  Exacerbated by: accidental movement  Progression: improved      Diagnostic Tests  X-ray: normal  MRI studies: abnormal  Treatments  Previous treatment: medication  Patient Goals  Patient goals for therapy: decreased pain, increased motion, increased strength and return to sport/leisure activities          Objective     Passive Range of Motion   Left Shoulder   Flexion: 150 (AROM 110) degrees   Abduction: 145 (AROM 90) degrees   External rotation 0°: 45 (AROM C6) degrees   Internal rotation 0°: 55 (AROM T12) degrees     Right Shoulder   Flexion: Right shoulder passive forward flexion: AROM 165  WFL  Abduction: Right shoulder passive abduction: AROM 160  [facility]  External rotation 0°: Right shoulder passive external rotation at 0 degrees: AROM C6  [facility]  Internal rotation 0°: Right shoulder passive internal rotation at 0 degrees: AROM T10   [facility]    Additional Passive Range of Motion Details  Forward head, rounded shoulders  B/L Sensation intact to light touch C3,4,5,6,7,8,T1,T2   strong and painless B  Shoulder Strength   L NT  R Flex 5 Abd 5 ER 5 IR 5  Elbow Range R L WFL B (L performed PROM)   CS Screen WFL and without pain  Portals: x 4, scabbing evident, no signs of infection  Precautions: Protocol RTCR + Tenodesis + Labral Debriedment + SAD  Pre DM      Daily Treatment Diary       Manual 1/9 1/13 1/16 1/20 1/23 1/30  2/6      PROM L shoulder 10 10  10 min 10 min  10 min 10 min  10 min                                                          Exercise Diary             CS Range 6" x 10 6"x10 6"x  10 6"x 10  6" x 10  HEP      Scap Add 10" x 10 :10x10 10" x 10 :10x10  10" x 10 :10x10  HEP      pendulums review 2x10 2 x 10 2 x 10  2 x 10 2 x 10 PRN      Elbow PROM 6" x 10 6"x10 6" x 10 6" x 10  6"x  10 6"x 10  6" x 20 AROM      Hand/putty Gripping 2-3 min yellow 3 min yellow Red 3 min  Red 3 min Red 3 min  HEP      Shoulder isometrics flex abd ext trial as able   6" x 10  6" x 10  6" x 10 6" x 10  6" x 10      Table slides flexion abd ER     6" x10 6" x 10  6" x 10      Wand AROM        X 10 flex abd er      Sup Flex and Abd       X 10                                                                                                                                                                  Modalities             CP to L shoulder prn    10 10 min 10 min  10 min

## 2020-02-12 ENCOUNTER — OFFICE VISIT (OUTPATIENT)
Dept: PHYSICAL THERAPY | Facility: CLINIC | Age: 55
End: 2020-02-12
Payer: COMMERCIAL

## 2020-02-12 DIAGNOSIS — S46.812D TRAUMATIC TEAR OF SUPRASPINATUS TENDON OF LEFT SHOULDER, SUBSEQUENT ENCOUNTER: Primary | ICD-10-CM

## 2020-02-12 DIAGNOSIS — Z51.89 AFTERCARE: ICD-10-CM

## 2020-02-12 PROCEDURE — 97110 THERAPEUTIC EXERCISES: CPT | Performed by: PHYSICAL THERAPIST

## 2020-02-12 PROCEDURE — 97140 MANUAL THERAPY 1/> REGIONS: CPT | Performed by: PHYSICAL THERAPIST

## 2020-02-12 NOTE — PROGRESS NOTES
Daily Note     Today's date: 2020  Patient name: Leelee Swanson  : 1965  MRN: [id number]  Referring provider: Madai Mccord MD  Dx:   Encounter Diagnosis     ICD-10-CM    1  Traumatic tear of supraspinatus tendon of left shoulder, subsequent encounter S46 812D    2  Aftercare Z51 89                   Subjective: Pt presents today stating he was very guarded and braced during a dentist appointment and has been very sore since then which was yesterday  Objective: See treatment diary below      Assessment:  Was sore t/o session, but did not have pain  Continue to progress as able pending how pt presents tomorrow with  Plan: Continue per plan of care  Precautions: Protocol RTCR + Tenodesis + Labral Debriedment + SAD  Pre DM      Daily Treatment Diary       Manual      PROM L shoulder 10 10  10 min 10 min  10 min 10 min  10 min 10 min                                                         Exercise Diary             CS Range 6" x 10 6"x10 6"x  10 6"x 10  6" x 10  HEP      Scap Add 10" x 10 :10x10 10" x 10 :10x10  10" x 10 :10x10  HEP      pendulums review 2x10 2 x 10 2 x 10  2 x 10 2 x 10 PRN      Elbow PROM 6" x 10 6"x10 6" x 10 6" x 10  6"x  10 6"x 10  6" x 20 AROM 6" x 30     Hand/putty Gripping 2-3 min yellow 3 min yellow Red 3 min  Red 3 min Red 3 min  HEP      Shoulder isometrics flex abd ext trial as able   6" x 10  6" x 10  6" x 10 6" x 10  6" x 10 6" x 10     Table slides flexion abd ER     6" x10 6" x 10  6" x 10 6" x 10     Wand AROM        X 10 flex abd er X 10 flex abd ER     standFlex and Abd       X 10 X 10     Pulley        3 min                                                                                                                                                     Modalities             CP to L shoulder prn    10 10 min 10 min  10 min

## 2020-02-13 ENCOUNTER — OFFICE VISIT (OUTPATIENT)
Dept: PHYSICAL THERAPY | Facility: CLINIC | Age: 55
End: 2020-02-13
Payer: COMMERCIAL

## 2020-02-13 DIAGNOSIS — S46.812D TRAUMATIC TEAR OF SUPRASPINATUS TENDON OF LEFT SHOULDER, SUBSEQUENT ENCOUNTER: Primary | ICD-10-CM

## 2020-02-13 DIAGNOSIS — Z51.89 AFTERCARE: ICD-10-CM

## 2020-02-13 PROCEDURE — 97140 MANUAL THERAPY 1/> REGIONS: CPT | Performed by: PHYSICAL THERAPIST

## 2020-02-13 PROCEDURE — 97110 THERAPEUTIC EXERCISES: CPT | Performed by: PHYSICAL THERAPIST

## 2020-02-13 NOTE — PROGRESS NOTES
Daily Note     Today's date: 2020  Patient name: Mayra Meyers  : 1965  MRN: [id number]  Referring provider: Rob Jordan MD  Dx:   Encounter Diagnosis     ICD-10-CM    1  Traumatic tear of supraspinatus tendon of left shoulder, subsequent encounter S46 812D    2  Aftercare Z51 89                    Subjective: Pt states he is feeling much better today no residual soreness  Objective: See treatment diary below      Assessment:  Asymptomatic t/o entire session  Enhanced reps, AROM abd still most limited  Plan: Continue per plan of care  Precautions: Protocol RTCR + Tenodesis + Labral Debriedment + SAD  Pre DM      Daily Treatment Diary       Manual     PROM L shoulder 10 10  10 min 10 min  10 min 10 min  10 min 10 min 10 min                                                        Exercise Diary             CS Range 6" x 10 6"x10 6"x  10 6"x 10  6" x 10  HEP      Scap Add 10" x 10 :10x10 10" x 10 :10x10  10" x 10 :10x10  HEP      pendulums review 2x10 2 x 10 2 x 10  2 x 10 2 x 10 PRN      Elbow PROM 6" x 10 6"x10 6" x 10 6" x 10  6"x  10 6"x 10  6" x 20 AROM 6" x 30 6"x 30    Hand/putty Gripping 2-3 min yellow 3 min yellow Red 3 min  Red 3 min Red 3 min  HEP      Shoulder isometrics flex abd ext trial as able   6" x 10  6" x 10  6" x 10 6" x 10  6" x 10 6" x 10 6"x  10    Table slides flexion abd ER     6" x10 6" x 10  6" x 10 6" x 10 6"x  10    Wand AROM        X 10 flex abd er X 10 flex abd ER x15    standFlex and Abd       X 10 X 10 x15    Pulley        3 min  3 min                                                                                                                                                   Modalities             CP to L shoulder prn    10 10 min 10 min  10 min 10 min 10 min

## 2020-02-19 ENCOUNTER — OFFICE VISIT (OUTPATIENT)
Dept: PHYSICAL THERAPY | Facility: CLINIC | Age: 55
End: 2020-02-19
Payer: COMMERCIAL

## 2020-02-19 DIAGNOSIS — Z51.89 AFTERCARE: ICD-10-CM

## 2020-02-19 DIAGNOSIS — S46.812D TRAUMATIC TEAR OF SUPRASPINATUS TENDON OF LEFT SHOULDER, SUBSEQUENT ENCOUNTER: Primary | ICD-10-CM

## 2020-02-19 PROCEDURE — 97140 MANUAL THERAPY 1/> REGIONS: CPT | Performed by: PHYSICAL THERAPIST

## 2020-02-19 PROCEDURE — 97110 THERAPEUTIC EXERCISES: CPT | Performed by: PHYSICAL THERAPIST

## 2020-02-19 NOTE — PROGRESS NOTES
Daily Note     Today's date: 2020  Patient name: Megha Serna  : 1965  MRN: [id number]  Referring provider: Janis Braswell MD  Dx:   Encounter Diagnosis     ICD-10-CM    1  Traumatic tear of supraspinatus tendon of left shoulder, subsequent encounter S46 812D    2  Aftercare Z51 89                    Subjective: Pt reports today stating that he is feeling well  States yesterday was a tough day but he is glad this is over  Objective: See treatment diary below      Assessment:  Mild Fatigue during session, but mass practice will help with endurance and tolerance to introduction to strength which can occur next week for week 9 band progression  Plan: Continue per plan of care  Precautions: Protocol RTCR + Tenodesis + Labral Debriedment + SAD  Pre DM      Daily Treatment Diary       Manual    PROM L shoulder 10 10  10 min 10 min  10 min 10 min  10 min 10 min 10 min 10 min                                                       Exercise Diary             CS Range 6" x 10 6"x10 6"x  10 6"x 10  6" x 10  HEP      Scap Add 10" x 10 :10x10 10" x 10 :10x10  10" x 10 :10x10  HEP      pendulums review 2x10 2 x 10 2 x 10  2 x 10 2 x 10 PRN      Elbow PROM 6" x 10 6"x10 6" x 10 6" x 10  6"x  10 6"x 10  6" x 20 AROM 6" x 30 6"x 30 6 x 30   Hand/putty Gripping 2-3 min yellow 3 min yellow Red 3 min  Red 3 min Red 3 min  HEP      Shoulder isometrics flex abd ext trial as able   6" x 10  6" x 10  6" x 10 6" x 10  6" x 10 6" x 10 6"x  10 6" x 10   Table slides flexion abd ER     6" x10 6" x 10  6" x 10 6" x 10 6"x  10 6" x 10   Wand AROM        X 10 flex abd er X 10 flex abd ER x15 X 20   standFlex and Abd       X 10 X 10 x15 X 20   Pulley        3 min  3 min 3 min   Sup AROM flex          2 x 10   SL R Abd and ER          2 x 10                                                                                                                        Modalities CP to L shoulder prn    10 10 min 10 min  10 min 10 min 10 min 10 min

## 2020-02-20 ENCOUNTER — OFFICE VISIT (OUTPATIENT)
Dept: PHYSICAL THERAPY | Facility: CLINIC | Age: 55
End: 2020-02-20
Payer: COMMERCIAL

## 2020-02-20 DIAGNOSIS — S46.812D TRAUMATIC TEAR OF SUPRASPINATUS TENDON OF LEFT SHOULDER, SUBSEQUENT ENCOUNTER: Primary | ICD-10-CM

## 2020-02-20 DIAGNOSIS — Z51.89 AFTERCARE: ICD-10-CM

## 2020-02-20 PROCEDURE — 97110 THERAPEUTIC EXERCISES: CPT

## 2020-02-20 PROCEDURE — 97140 MANUAL THERAPY 1/> REGIONS: CPT

## 2020-02-20 NOTE — PROGRESS NOTES
Daily Note     Today's date: 2020  Patient name: Iliana Frye  : 1965  MRN: [id number]  Referring provider: Rui Boyer MD  Dx:   Encounter Diagnosis     ICD-10-CM    1  Traumatic tear of supraspinatus tendon of left shoulder, subsequent encounter S46 812D    2  Aftercare Z51 89                   Subjective: patient states that he notices some soreness at end ROM  But otherwise is progressing well  Objective: See treatment diary below      Assessment: Tolerated treatment well  Patient exhibited good technique with therapeutic exercises      Plan: Continue per plan of care  Precautions: Protocol RTCR + Tenodesis + Labral Debriedment + SAD  Pre DM      Daily Treatment Diary       Manual    PROM L shoulder       10 min 10 min 10 min 10 min                                                       Exercise Diary             CS Range       HEP      Scap Add       HEP      pendulums       PRN      Elbow PROM       6" x 20 AROM 6" x 30 6"x 30 6 x 30   Hand/putty Gripping       HEP      Shoulder isometrics flex abd ext trial as able 6" x 10        6" x 10 6" x 10 6"x  10 6" x 10   Table slides flexion abd ER 6" x 10       6" x 10 6" x 10 6"x  10 6" x 10   Wand AROM  Flex/jimmy x 20      X 10 flex abd er X 10 flex abd ER x15 X 20   standFlex and Abd X 20       X 10 X 10 x15 X 20   Pulley 3 min       3 min  3 min 3 min   Sup AROM flex 2 x 10          2 x 10   SL R Abd and ER 2 x 10          2 x 10   IR strap stretch :10x 10                                                                                                                     Modalities             CP to L shoulder prn 10   10 10 min 10 min  10 min 10 min 10 min 10 min

## 2020-02-26 ENCOUNTER — APPOINTMENT (OUTPATIENT)
Dept: PHYSICAL THERAPY | Facility: CLINIC | Age: 55
End: 2020-02-26
Payer: COMMERCIAL

## 2020-02-27 ENCOUNTER — APPOINTMENT (OUTPATIENT)
Dept: PHYSICAL THERAPY | Facility: CLINIC | Age: 55
End: 2020-02-27
Payer: COMMERCIAL

## 2020-02-28 ENCOUNTER — OFFICE VISIT (OUTPATIENT)
Dept: PHYSICAL THERAPY | Facility: CLINIC | Age: 55
End: 2020-02-28
Payer: COMMERCIAL

## 2020-02-28 ENCOUNTER — TRANSCRIBE ORDERS (OUTPATIENT)
Dept: LAB | Facility: CLINIC | Age: 55
End: 2020-02-28

## 2020-02-28 ENCOUNTER — APPOINTMENT (OUTPATIENT)
Dept: LAB | Facility: CLINIC | Age: 55
End: 2020-02-28
Payer: COMMERCIAL

## 2020-02-28 DIAGNOSIS — S46.812D TRAUMATIC TEAR OF SUPRASPINATUS TENDON OF LEFT SHOULDER, SUBSEQUENT ENCOUNTER: Primary | ICD-10-CM

## 2020-02-28 DIAGNOSIS — R97.20 ELEVATED PSA: ICD-10-CM

## 2020-02-28 DIAGNOSIS — Z51.89 AFTERCARE: ICD-10-CM

## 2020-02-28 LAB — PSA SERPL-MCNC: 4.8 NG/ML (ref 0–4)

## 2020-02-28 PROCEDURE — 97110 THERAPEUTIC EXERCISES: CPT | Performed by: PHYSICAL THERAPIST

## 2020-02-28 PROCEDURE — 84153 ASSAY OF PSA TOTAL: CPT

## 2020-02-28 PROCEDURE — 97140 MANUAL THERAPY 1/> REGIONS: CPT | Performed by: PHYSICAL THERAPIST

## 2020-02-28 PROCEDURE — 97112 NEUROMUSCULAR REEDUCATION: CPT | Performed by: PHYSICAL THERAPIST

## 2020-02-28 NOTE — PROGRESS NOTES
Daily Note     Today's date: 2020  Patient name: Tavon Rodgers  : 1965  MRN: [id number]  Referring provider: Vanessa Booth MD  Dx:   Encounter Diagnosis     ICD-10-CM    1  Traumatic tear of supraspinatus tendon of left shoulder, subsequent encounter S46 812D    2  Aftercare Z51 89                   Subjective: He states he is doing fairly well and his arm is still getting sore in anterior shoulder  Objective: See treatment diary below      Assessment: Tolerated treatment well  Added in rows, TB, and AROM  He was given bands and printouts and will be progressed as tolerable  Clicking noticed during RTB IR but decreased c use of roll  Plan: Continue per plan of care  Precautions: Protocol RTCR + Tenodesis + Labral Debriedment + SAD  Pre DM      Daily Treatment Diary       Manual    PROM L shoulder  10'     10 min 10 min 10 min 10 min                                                       Exercise Diary             CS Range       HEP      Scap Add       HEP      pendulums       PRN      Elbow PROM       6" x 20 AROM 6" x 30 6"x 30 6 x 30   Hand/putty Gripping       HEP      Shoulder isometrics flex abd ext trial as able 6" x 10        6" x 10 6" x 10 6"x  10 6" x 10   Table slides flexion abd ER 6" x 10  10x :06     6" x 10 6" x 10 6"x  10 6" x 10   Wand AROM  Flex/jimmy x 20 20x flex/abd     X 10 flex abd er X 10 flex abd ER x15 X 20   standFlex and Abd X 20  x20     X 10 X 10 x15 X 20   Pulley 3 min 3'      3 min  3 min 3 min   Sup AROM flex 2 x 10  2x10        2 x 10   SL R Abd and ER 2 x 10  2x10        2 x 10   IR strap stretch :10x 10  10x :10           AROM abd  10x           Bent over row #1  2x10           RTB IR/ext  2x10                                                                            Modalities             CP to L shoulder prn 10   10 10 min 10 min  10 min 10 min 10 min 10 min

## 2020-03-02 ENCOUNTER — OFFICE VISIT (OUTPATIENT)
Dept: UROLOGY | Facility: CLINIC | Age: 55
End: 2020-03-02
Payer: COMMERCIAL

## 2020-03-02 VITALS
WEIGHT: 176 LBS | HEART RATE: 67 BPM | BODY MASS INDEX: 30.05 KG/M2 | SYSTOLIC BLOOD PRESSURE: 120 MMHG | HEIGHT: 64 IN | DIASTOLIC BLOOD PRESSURE: 68 MMHG

## 2020-03-02 DIAGNOSIS — R97.20 ELEVATED PSA: Primary | ICD-10-CM

## 2020-03-02 PROCEDURE — 99213 OFFICE O/P EST LOW 20 MIN: CPT | Performed by: PHYSICIAN ASSISTANT

## 2020-03-02 NOTE — PROGRESS NOTES
3/2/2020    Clemente Goldstein  1965  [id number]    Discussion and Plan    1  Elevated PSA  - I reviewed with the patient that his PSA is elevated again  We discussed given his age and high PSA, recommendations would be to proceed with transrectal ultrasound-guided prostate biopsy  - patient reports that he is moving to [facility] next month  Encouragde patient to start seeking urologic consultation in the near future in [place] to discuss prostate biopsy  - patient elects to establish care with a urologist down in [place]  Our records will be faxed to the patient per his preference  He was encouraged to contact us with any questions or concerns  All questions answered  Patient Active Problem List   Diagnosis    Diabetes mellitus with no complication (HCC)    Hyperlipidemia    Elevated PSA    Traumatic tear of supraspinatus tendon of left shoulder       History of Present Illness    Luis M Vasquez is a 48 y o  male patient previously under the care of Dr Eluterio Dancer presenting for follow-up of elevated PSA  Patient had been found to have an elevated PSA of 4 3 in October 2018  He has no prior records available for review  PSA  normalized then at 3 6 (01/23/2019)  Patient presents today with a repeat PSA which has risen to 4 8 (02/28/2020)  Patient has never had a prostate biopsy  Patient reports that he has been doing well over the course the past year  Has had some intermittent urinary urgency  Denies any dysuria, gross hematuria, incontinence, perineal pressure, suprapubic pressure, flank pain, fevers, or chills  Denies any bone pain or weight loss  Currently not on any pharmacotherapy for his prostate or bladder          Lab Results   Component Value Date    PSA 4 8 (H) 02/28/2020    PSA 3 6 01/23/2019       Urinary Symptom Assessment        Past Medical History  Past Medical History:   Diagnosis Date    Diabetes mellitus ([facility] [address] )     controlled with exercise and diet    Sleep apnea     uses CPAP at night       Past Social History  Past Surgical History:   Procedure Laterality Date    HERNIA REPAIR      GA ARTHROSCOPY SHOULDER SURGICAL BICEPS TENODESIS Right 12/18/2017    Procedure: ARTHROSCOPIC BICEPS TENODESIS;  Surgeon: Ananda Dia MD;  [facility];  Service: Orthopedics    GA SHLDR ARTHROSCOP,SURG,W/ROTAT CUFF REPR Right 12/18/2017    Procedure: RIGHT SHOULDER ARTHROSCOPIC ROTATOR CUFF REPAIR;  Surgeon: Ananda Dia MD;  [facility];  Service: Orthopedics    GA REECER ARTHROSCOP,SURG,W/ROTAT CUFF REPR Left 12/26/2019    Procedure: SHOULDER ARTHROSCOPIC ROTATOR CUFF  REPAIR; BICEPS TENODESIS; SUBACROMIAL DECOMPRESSION; LABRAL DEBRIDEMENT;  Surgeon: Ananda Dia MD;  [facility];  Service: Orthopedics       Past Family History  Family History   Problem Relation Age of Onset    Hypertension Father     Cancer Father     Diabetes Maternal Grandmother     Heart disease Maternal Grandmother        Past Social history  Social History     Socioeconomic History    Marital status: /Civil Union     Spouse name: Not on file    Number of children: Not on file    Years of education: Not on file    Highest education level: Not on file   Occupational History    Not on file   Social Needs    Financial resource strain: Not on file    Food insecurity:     Worry: Not on file     Inability: Not on file    Transportation needs:     Medical: Not on file     Non-medical: Not on file   Tobacco Use    Smoking status: Never Smoker    Smokeless tobacco: Never Used   Substance and Sexual Activity    Alcohol use: Yes     Frequency: Monthly or less     Drinks per session: 1 or 2     Comment: occ    Drug use: No    Sexual activity: Not on file   Lifestyle    Physical activity:     Days per week: Not on file     Minutes per session: Not on file    Stress: Not on file   Relationships    Social connections:     Talks on phone: Not on file     Gets together: Not on file Attends Voodoo service: Not on file     Active member of club or organization: Not on file     Attends meetings of clubs or organizations: Not on file     Relationship status: Not on file    Intimate partner violence:     Fear of current or ex partner: Not on file     Emotionally abused: Not on file     Physically abused: Not on file     Forced sexual activity: Not on file   Other Topics Concern    Not on file   Social History Narrative    Not on file       Current Medications  Current Outpatient Medications   Medication Sig Dispense Refill    ACETAMINOPHEN PO Take by mouth      Cholecalciferol (VITAMIN D3) 1000 units CAPS Take by mouth      Glucosamine-Chondroitin (MOVE FREE PO) Take by mouth      glucose blood test strip Use as directed , tests FBS  daily      Lancets MISC Use as directed, tests daily      Multiple Vitamin (MULTIVITAMIN) capsule Take 1 capsule by mouth daily      Omega-3 1000 MG CAPS Take by mouth       No current facility-administered medications for this visit  Allergies  Allergies   Allergen Reactions    Cumin Oil Anaphylaxis     Has an EPI pen  Has an EPI pen       Past Medical History, Social History, Family History, medications and allergies were reviewed  Review of Systems  Review of Systems   Constitutional: Negative  HENT: Negative  Eyes: Negative  Respiratory: Negative  Cardiovascular: Negative  Gastrointestinal: Negative  Endocrine: Negative  Musculoskeletal: Negative  Skin: Negative  Neurological: Negative  Hematological: Negative  Psychiatric/Behavioral: Negative  Vitals  Vitals:    03/02/20 1012   BP: 120/68   BP Location: Left arm   Patient Position: Sitting   Cuff Size: Adult   Pulse: 67   Weight: 79 8 kg (176 lb)   Height: 5' 4" (1 626 m)         Physical Exam    Physical Exam   Constitutional: He is oriented to person, place, and time  He appears well-developed and well-nourished     HENT:   Head: Normocephalic and atraumatic  Eyes: Pupils are equal, round, and reactive to light  Neck: Normal range of motion  Cardiovascular: Normal rate, regular rhythm and normal heart sounds  Pulmonary/Chest: Effort normal and breath sounds normal  No accessory muscle usage  No respiratory distress  Abdominal: Soft  Normal appearance and bowel sounds are normal  There is no tenderness  Genitourinary:   Genitourinary Comments: Good rectal tone  Prostate 40g, smooth, symmetric, no palpable nodules   Musculoskeletal: Normal range of motion  Neurological: He is alert and oriented to person, place, and time  Skin: Skin is warm, dry and intact  Psychiatric: He has a normal mood and affect  His speech is normal  Cognition and memory are normal    Nursing note and vitals reviewed        Results    Below listed labs, pathology results, and radiology images were personally reviewed:    Lab Results   Component Value Date/Time    PSA 4 8 (H) 02/28/2020 02:03 PM     Lab Results   Component Value Date    CALCIUM 9 7 11/19/2019    K 4 1 11/19/2019    CO2 27 11/19/2019     11/19/2019    BUN 13 11/19/2019    CREATININE 0 90 11/19/2019     No results found for: WBC, HGB, HCT, MCV, PLT    No results found for this or any previous visit (from the past 1 hour(s)) ]

## 2020-03-03 ENCOUNTER — TELEPHONE (OUTPATIENT)
Dept: UROLOGY | Facility: AMBULATORY SURGERY CENTER | Age: 55
End: 2020-03-03

## 2020-03-03 ENCOUNTER — OFFICE VISIT (OUTPATIENT)
Dept: PHYSICAL THERAPY | Facility: CLINIC | Age: 55
End: 2020-03-03
Payer: COMMERCIAL

## 2020-03-03 DIAGNOSIS — S46.812D TRAUMATIC TEAR OF SUPRASPINATUS TENDON OF LEFT SHOULDER, SUBSEQUENT ENCOUNTER: Primary | ICD-10-CM

## 2020-03-03 DIAGNOSIS — Z51.89 AFTERCARE: ICD-10-CM

## 2020-03-03 PROCEDURE — 97140 MANUAL THERAPY 1/> REGIONS: CPT

## 2020-03-03 PROCEDURE — 97110 THERAPEUTIC EXERCISES: CPT

## 2020-03-03 NOTE — PROGRESS NOTES
Daily Note     Today's date: 3/3/2020  Patient name: Clemente Guillen  : 1965  MRN: [id number]  Referring provider: Bonnie Landon MD  Dx:   Encounter Diagnosis     ICD-10-CM    1  Traumatic tear of supraspinatus tendon of left shoulder, subsequent encounter S46 812D    2  Aftercare Z51 89                   Subjective: Pt states he has been feeling pretty good  Pt states he notices a change with weather but nothing to bad  Objective: See treatment diary below      Assessment:  Pt progressed through exercises well as he is progressing at expected  Pt required VC for proper posture throughout session  Pt demonstrates over all good ROM seen with self stretching and manual thearpy  Pt would continue to benefit from PT  Plan: Continue per plan of care  Precautions: Protocol RTCR + Tenodesis + Labral Debriedment + SAD  Pre DM      Daily Treatment Diary       Manual 2/20 2/28 3/3    2/6 2/12 2/13 2/19   PROM L shoulder  10' 8 mins     10 min 10 min 10 min 10 min                                                       Exercise Diary             CS Range       HEP      Scap Add       HEP      pendulums       PRN      Elbow PROM       6" x 20 AROM 6" x 30 6"x 30 6 x 30   Hand/putty Gripping       HEP      Shoulder isometrics flex abd ext trial as able 6" x 10        6" x 10 6" x 10 6"x  10 6" x 10   Table slides flexion abd ER 6" x 10  10x :06 10 x 6"     6" x 10 6" x 10 6"x  10 6" x 10   Wand AROM  Flex/jimmy x 20 20x flex/abd 20x flex/abd    X 10 flex abd er X 10 flex abd ER x15 X 20   standFlex and Abd X 20  x20 20x     X 10 X 10 x15 X 20   Pulley 3 min 3' 3'      3 min  3 min 3 min   Sup AROM flex 2 x 10  2x10 2 x10        2 x 10   SL R Abd and ER 2 x 10  2x10 2 x10        2 x 10   IR strap stretch :10x 10  10x :10 10 x10"           AROM abd  10x 2  x10           Bent over row #1  2x10 1# 2  x10           RTB IR/ext  2x10 RTB 2  x10 Modalities             CP to L shoulder prn 10  10 mins   10 min 10 min  10 min 10 min 10 min 10 min

## 2020-03-03 NOTE — TELEPHONE ENCOUNTER
Records were printed and patient was called and made aware that they will be available for  at the  of the [facility] office on 3-4-20   ([place] of [place])

## 2020-03-10 VITALS
WEIGHT: 176 LBS | SYSTOLIC BLOOD PRESSURE: 160 MMHG | DIASTOLIC BLOOD PRESSURE: 98 MMHG | BODY MASS INDEX: 30.05 KG/M2 | HEART RATE: 73 BPM | HEIGHT: 64 IN

## 2020-03-10 DIAGNOSIS — S46.812A TRAUMATIC TEAR OF SUPRASPINATUS TENDON OF LEFT SHOULDER, INITIAL ENCOUNTER: Primary | ICD-10-CM

## 2020-03-10 PROCEDURE — 99024 POSTOP FOLLOW-UP VISIT: CPT | Performed by: ORTHOPAEDIC SURGERY

## 2020-03-10 NOTE — PROGRESS NOTES
Patient Name:  Elsa Osullivan  MRN:  [id number]    Assessment     1  Traumatic tear of supraspinatus tendon of left shoulder, initial encounter         Plan     Left Shoulder Arthroscopic Rotator Cuff Repair; Biceps Tenodesis; Subacromial Decompression; Labral Debridement 12/26/19  1  Continue physical therapy, HEP as appropriate  2  Gradually return to normal activities as tolerated  3  Patient is relocating to [facility] for his job  Follow-up as needed  Subjective     53-year-old male returns to the office today status post Left Shoulder Arthroscopic Rotator Cuff Repair; Biceps Tenodesis; Subacromial Decompression; Labral Debridement 12/26/19  Today he has no complaints  He denies any pain  He notes mild residual stiffness and weakness which is improving with physical therapy  He denies any instability  No numbness or tingling  No fevers or chills  He is extremely happy with his result  Objective     /98   Pulse 73   Ht 5' 4" (1 626 m)   Wt 79 8 kg (176 lb)   BMI 30 21 kg/m²     Left shoulder:  No gross deformity  No tenderness to palpation  Passive range of motion includes 150° of forward flexion, 80° of external rotation-abduction, 50° of internal rotation-abduction  Negative Jacobs impingement test   Negative empty can test   Negative speed's test   Negative cross-body adduction test   Sensation intact axillary, median, ulnar and radial nerves  2+ radial pulse        Scribe Attestation    I,:   Lura Kehr, PA-C am acting as a scribe while in the presence of the attending physician :        I,:   Curtis Arellano MD personally performed the services described in this documentation    as scribed in my presence :

## 2020-03-11 ENCOUNTER — OFFICE VISIT (OUTPATIENT)
Dept: PHYSICAL THERAPY | Facility: CLINIC | Age: 55
End: 2020-03-11
Payer: COMMERCIAL

## 2020-03-11 DIAGNOSIS — Z51.89 AFTERCARE: ICD-10-CM

## 2020-03-11 DIAGNOSIS — S46.812D TRAUMATIC TEAR OF SUPRASPINATUS TENDON OF LEFT SHOULDER, SUBSEQUENT ENCOUNTER: Primary | ICD-10-CM

## 2020-03-11 PROCEDURE — 97140 MANUAL THERAPY 1/> REGIONS: CPT | Performed by: PHYSICAL THERAPIST

## 2020-03-11 PROCEDURE — 97110 THERAPEUTIC EXERCISES: CPT | Performed by: PHYSICAL THERAPIST

## 2020-03-11 PROCEDURE — 97112 NEUROMUSCULAR REEDUCATION: CPT | Performed by: PHYSICAL THERAPIST

## 2020-03-11 NOTE — PROGRESS NOTES
Daily Note     Today's date: 3/11/2020  Patient name: Tavon Rodgers  : 1965  MRN: [id number]  Referring provider: Vanessa Booth MD  Dx:   Encounter Diagnosis     ICD-10-CM    1  Traumatic tear of supraspinatus tendon of left shoulder, subsequent encounter S46 812D    2  Aftercare Z51 89                   Subjective: Pt presents today stating he is feeling very well  Mild soreness and pain 1-2 10 at worst, sleep is going well  Objective: See treatment diary below      Assessment:  Reviewed protocol, educated he can start performing all desired activities at a slow progression on 3/19/2020  Next week is his last scheduled visits before he mores to [facility] with ER strength, but otherwise progressing very well  Plan: Continue per plan of care  Precautions: Protocol RTCR + Tenodesis + Labral Debriedment + SAD  Pre DM      Daily Treatment Diary       Manual 2/20 2/28 3/3 3/11   2/6 2/12 2/13 2/19   PROM L shoulder  10' 8 mins  10 min   10 min 10 min 10 min 10 min                                                       Exercise Diary             CS Range       HEP      Scap Add       HEP      pendulums       PRN      Elbow PROM       6" x 20 AROM 6" x 30 6"x 30 6 x 30   Hand/putty Gripping       HEP      Shoulder isometrics flex abd ext trial as able 6" x 10        6" x 10 6" x 10 6"x  10 6" x 10   Table slides flexion abd ER 6" x 10  10x :06 10 x 6"  dc   6" x 10 6" x 10 6"x  10 6" x 10   Wand AROM  Flex/jimmy x 20 20x flex/abd 20x flex/abd    X 10 flex abd er X 10 flex abd ER x15 X 20   standFlex and Abd X 20  x20 20x  30x   X 10 X 10 x15 X 20   Pulley 3 min 3' 3'  3 min    3 min  3 min 3 min   Sup AROM flex 2 x 10  2x10 2 x10  2 x 10      2 x 10   SL R Abd and ER 2 x 10  2x10 2 x10  2 x 10      2 x 10   IR strap stretch :10x 10  10x :10 10 x10"  10" x 10         AROM abd  10x 2  x10  3  x 10 1#         Bent over row #1  2x10 1# 2  x10  1# 3 x 10         RTB IR/ext  2x10 RTB 2  x10  RTB 2 x 10         TBand Row + Ext    RTB 2 x 10                                                             Modalities             CP to L shoulder prn 10  10 mins  10 min 10 min 10 min  10 min 10 min 10 min 10 min

## 2020-03-17 ENCOUNTER — APPOINTMENT (OUTPATIENT)
Dept: PHYSICAL THERAPY | Facility: CLINIC | Age: 55
End: 2020-03-17
Payer: COMMERCIAL

## 2020-03-18 ENCOUNTER — OFFICE VISIT (OUTPATIENT)
Dept: PHYSICAL THERAPY | Facility: CLINIC | Age: 55
End: 2020-03-18
Payer: COMMERCIAL

## 2020-03-18 DIAGNOSIS — S46.812D TRAUMATIC TEAR OF SUPRASPINATUS TENDON OF LEFT SHOULDER, SUBSEQUENT ENCOUNTER: Primary | ICD-10-CM

## 2020-03-18 DIAGNOSIS — Z51.89 AFTERCARE: ICD-10-CM

## 2020-03-18 PROCEDURE — 97140 MANUAL THERAPY 1/> REGIONS: CPT | Performed by: PHYSICAL THERAPIST

## 2020-03-18 PROCEDURE — 97110 THERAPEUTIC EXERCISES: CPT | Performed by: PHYSICAL THERAPIST

## 2020-03-18 NOTE — PROGRESS NOTES
Daily Note     Today's date: 3/18/2020  Patient name: Jamie Alexander  : 1965  MRN: [id number]  Referring provider: Romilda Libman, MD  Dx:   Encounter Diagnosis     ICD-10-CM    1  Traumatic tear of supraspinatus tendon of left shoulder, subsequent encounter S46 812D    2  Aftercare Z51 89                   Subjective: Pt presents today for his last scheduled visit at 12 weeks scheduled as he is moving to [facility] very soon  He states that his shoulder has been stiff and sore but as a whole feeling well with the move and daily activities  Objective: See treatment diary below      Assessment:  Pt able to progress well with review of protocol and exercises  Pt at this time is to make today his last scheduled visit, review of if there is concern he can follow up with PT intervention in his new area of living and he is also welcome to call/email this facility and primary therapist if there are any concerns as well  Plan: Continue per plan of care  Precautions: Protocol RTCR + Tenodesis + Labral Debriedment + SAD  Pre DM      Daily Treatment Diary       Manual 2/20 2/28 3/3 3/11 3/18  2/6 2/12 2/13 2/19   PROM L shoulder  10' 8 mins  10 min  10 min  10 min 10 min 10 min 10 min                                                       Exercise Diary             CS Range       HEP      Scap Add       HEP      pendulums       PRN      Elbow PROM       6" x 20 AROM 6" x 30 6"x 30 6 x 30   Hand/putty Gripping       HEP      Shoulder isometrics flex abd ext trial as able 6" x 10        6" x 10 6" x 10 6"x  10 6" x 10   Table slides flexion abd ER 6" x 10  10x :06 10 x 6"  dc   6" x 10 6" x 10 6"x  10 6" x 10   Wand AROM  Flex/jimmy x 20 20x flex/abd 20x flex/abd    X 10 flex abd er X 10 flex abd ER x15 X 20   standFlex and Abd X 20  x20 20x  30x 30x 2#  X 10 X 10 x15 X 20   Pulley 3 min 3' 3'  3 min 3 min   3 min  3 min 3 min   Sup AROM flex 2 x 10  2x10 2 x10  2 x 10      2 x 10   SL R Abd and ER 2 x 10  2x10 2 x10  2 x 10      2 x 10   IR strap stretch :10x 10  10x :10 10 x10"  10" x 10         AROM abd  10x 2  x10  3  x 10 1# 3 x 10 2#        Bent over row #1  2x10 1# 2  x10  1# 3 x 10 2# 3 x 10        RTB IR/ext  2x10 RTB 2  x10  RTB 2 x 10 GTB 2 x 10        TBand Row + Ext    RTB 2 x 10 GTB 2 x10                                                             Modalities             CP to L shoulder prn 10  10 mins  10 min 10 min 10 min  10 min 10 min 10 min 10 min

## 2020-04-02 NOTE — PROGRESS NOTES
PT Discharge    Today's date: 2020  Patient name: Milla Dias  : 1965  MRN: [id number]  Referring provider: Lily Jimenez MD  Dx:   Encounter Diagnosis     ICD-10-CM    1  Traumatic tear of supraspinatus tendon of left shoulder, subsequent encounter S46 812D    2  Aftercare Z51 89        Start Time: 0930  Stop Time: 1015  Total time in clinic (min): 45 minutes    Assessment/Plan  Pt has not been present since 3/18/2020  Pt's chart will be DC in compliance of facility policy as all Charts are DC within 30 days of last scheduled visit          Subjective    Objective    Flowsheet Rows      Most Recent Value   PT/OT G-Codes   Current Score  74   Projected Score  69

## 2023-03-11 NOTE — PROGRESS NOTES
Daily Note     Today's date: 2018  Patient name: Augusta Santiago  : 1965  MRN: [id number]  Referring provider: Thien Rojas MD  Dx:   Encounter Diagnosis     ICD-10-CM    1  Acute pain of right shoulder M25 511    2  Encounter for aftercare Z51 89                   Subjective: He states he had some pain over the weekend 1-3/10 pain from the changing weather  Stretching seemed to help  Objective: See treatment diary below      Assessment: Tolerated treatment well  Patient demonstrated improved flex PROM to about 160 and abduction to about 150  ER overhead to about 60 degrees  Plan: Continue per plan of care       Precautions: see Dr Deondre Wallace Cuff Repair Protocol      Daily Treatment Diary   Manual  3/28 4/2 4/4  4/11  4/16  3/15  3/16   R sh PROM  10' 10'  10  10 10'  EW  EW   Inf/post glides   G1  5 min  5'  5'  5' 5'       Roller     np                                                       Exercise Diary   3/28  4/2  4/4  4/11  4/16  3/15  3/16                     Pulleys  5' 5'  5'  5'  5'   5'  5'   Finger ladder flex 10" x 10 10" x 10  10x10''  10"x10 10 x 10"   10" x 10  10" x 10   Finger ladder scaption 10" x 10 10" x 10  10x10''  10"x10 10 x 10"   10" x 10  10" x 10   wall slides flexion/abd- if jamel  10x 10 10x :10  10x :10     AROm flex #1   10x 2x10 2x10     Wall ER ST  nv  nv      Supine cane flexion 10" x 10 10" x 10  10x10''  10"x10  10 x 10"   10" x 10  10" x 10   Sleeper ST 5x :10 5x :10 10x :10 10x :10 10x :10     Supine cane ER 10"x10 10"10 10x :10 10x :10 10x :10     IR strap ST nv    10x :10 10x :10     Wand ext AAROM  10x :05 10x :05 10x :05 10x :05     Prone row #3  2x10 np 2x10 2x10     UE WB on scale   #50 x10  #85 x10     TB ER RTB    2x10 2x10     Prone Ts/Ys    2x10 2x10     S/L ER    2x15 2x15           Modalities  3/28 4/2  4/4  4/11    3/15     CP PRN np np np  10'    10'                                     
22

## (undated) DEVICE — EXPRESSEW III SUTURE NEEDLE FOR USE WITH EXPRESSEW II OR III SUTURE PASSER: Brand: EXPRESSEW

## (undated) DEVICE — CURITY NON-ADHERENT STRIPS: Brand: CURITY

## (undated) DEVICE — ADHESIVE SKIN HIGH VISCOSITY EXOFIN 1ML

## (undated) DEVICE — PUDDLE VAC

## (undated) DEVICE — NEEDLE SUT SCORPION MULTIFIRE

## (undated) DEVICE — SHOULDER STABILIZATION KIT,                                    DISPOSABLE 12 PER BOX

## (undated) DEVICE — GLOVE SRG BIOGEL 8.5

## (undated) DEVICE — INTENDED FOR TISSUE SEPARATION, AND OTHER PROCEDURES THAT REQUIRE A SHARP SURGICAL BLADE TO PUNCTURE OR CUT.: Brand: BARD-PARKER ® CARBON RIB-BACK BLADES

## (undated) DEVICE — BLADE SHAVER CUTTER BN 4MM 13CM COOLCUT

## (undated) DEVICE — VAPR COOLPULSE 90 ELECTRODE 90 DEGREES SUCTION WITH INTEGRATED HANDPIECE: Brand: VAPR COOLPULSE

## (undated) DEVICE — CHLORAPREP HI-LITE 26ML ORANGE

## (undated) DEVICE — NEEDLE SPINAL18G X 3.5 IN QUINCKE

## (undated) DEVICE — BURR  OVAL 4MM 13CM 8 FLUTE COOLCUT

## (undated) DEVICE — ARTHROSCOPY FLOOR MAT

## (undated) DEVICE — SUT ETHILON 3-0 PS-1 18 IN 1663H

## (undated) DEVICE — SUT PDS II 1 CT-1 27 IN Z347H

## (undated) DEVICE — POSITIONER BEACH CHAIR FOAM KIT

## (undated) DEVICE — PACK MAJOR ORTHO W/SPLITS PBDS

## (undated) DEVICE — IMPERVIOUS STOCKINETTE: Brand: DEROYAL

## (undated) DEVICE — THREADED CLEAR CANNULA WITH OBTURATOR 8.5MM X 75MM

## (undated) DEVICE — LIGHT HANDLE COVER SLEEVE DISP BLUE STELLAR

## (undated) DEVICE — GAUZE SPONGES,16 PLY: Brand: CURITY

## (undated) DEVICE — TUBING SUCTION 5MM X 12 FT

## (undated) DEVICE — TUBING ARTHROSCOPY REDUCE PATIENT

## (undated) DEVICE — GLOVE INDICATOR PI UNDERGLOVE SZ 8.5 BLUE

## (undated) DEVICE — THREADED CLEAR CANNULA WITH OBTURATOR 7MM X 75MM

## (undated) DEVICE — SUT MONOCRYL 3-0 PS-2 18 IN Y497G

## (undated) DEVICE — 3M™ MICROFOAM™ SURGICAL TAPE 4 ROLLS/CARTON 6 CARTONS/CASE 1528-3: Brand: 3M™ MICROFOAM™

## (undated) DEVICE — BETHLEHEM UNIV MAJOR ORTHO,KIT: Brand: CARDINAL HEALTH

## (undated) DEVICE — ABDOMINAL PAD: Brand: DERMACEA

## (undated) DEVICE — DRESSING MEPILEX AG BORDER 4 X 4 IN